# Patient Record
Sex: FEMALE | Race: WHITE | NOT HISPANIC OR LATINO | Employment: FULL TIME | URBAN - METROPOLITAN AREA
[De-identification: names, ages, dates, MRNs, and addresses within clinical notes are randomized per-mention and may not be internally consistent; named-entity substitution may affect disease eponyms.]

---

## 2017-02-21 ENCOUNTER — TELEPHONE (OUTPATIENT)
Dept: PEDIATRICS | Facility: CLINIC | Age: 32
End: 2017-02-21

## 2017-02-21 DIAGNOSIS — O92.70 LACTATION DISORDER: Primary | ICD-10-CM

## 2017-02-21 RX ORDER — MUPIROCIN 20 MG/G
OINTMENT TOPICAL
Qty: 22 G | Refills: 1 | Status: SHIPPED | OUTPATIENT
Start: 2017-02-21 | End: 2017-03-29

## 2017-03-29 ENCOUNTER — HOSPITAL ENCOUNTER (EMERGENCY)
Facility: CLINIC | Age: 32
Discharge: HOME | End: 2017-03-29
Attending: EMERGENCY MEDICINE | Admitting: EMERGENCY MEDICINE
Payer: COMMERCIAL

## 2017-03-29 VITALS
TEMPERATURE: 97.8 F | DIASTOLIC BLOOD PRESSURE: 67 MMHG | OXYGEN SATURATION: 97 % | SYSTOLIC BLOOD PRESSURE: 108 MMHG | HEART RATE: 73 BPM | WEIGHT: 165 LBS | RESPIRATION RATE: 16 BRPM

## 2017-03-29 LAB
AMPHETAMINES UR QL SCN: NORMAL
BARBITURATES UR QL: NORMAL
BENZODIAZ UR QL: NORMAL
CANNABINOIDS UR QL SCN: NORMAL
COCAINE UR QL: NORMAL
ETHANOL UR QL SCN: NORMAL
OPIATES UR QL SCN: NORMAL

## 2017-03-29 PROCEDURE — 80320 DRUG SCREEN QUANTALCOHOLS: CPT | Performed by: EMERGENCY MEDICINE

## 2017-03-29 PROCEDURE — 99285 EMERGENCY DEPT VISIT HI MDM: CPT | Mod: 25

## 2017-03-29 PROCEDURE — 90791 PSYCH DIAGNOSTIC EVALUATION: CPT

## 2017-03-29 PROCEDURE — 80307 DRUG TEST PRSMV CHEM ANLYZR: CPT | Performed by: EMERGENCY MEDICINE

## 2017-03-29 PROCEDURE — 99284 EMERGENCY DEPT VISIT MOD MDM: CPT | Mod: Z6 | Performed by: EMERGENCY MEDICINE

## 2017-03-29 RX ORDER — SERTRALINE HYDROCHLORIDE 25 MG/1
25 TABLET, FILM COATED ORAL DAILY
Qty: 30 TABLET | Refills: 1 | Status: SHIPPED | OUTPATIENT
Start: 2017-03-29 | End: 2020-11-17

## 2017-03-29 RX ORDER — PRENATAL VIT/IRON FUM/FOLIC AC 27MG-0.8MG
1 TABLET ORAL DAILY
COMMUNITY
End: 2020-08-28

## 2017-03-29 NOTE — DISCHARGE INSTRUCTIONS
"Begin zoloft.     Call your OB doctor tomorrow and arrange follow up for 2 weeks for medication recheck.     Individual therapy was scheduled for next Monday, 9 am.     If you are feeling worse, feeling suicidal or homicidal, seek medical attention asap.         Understanding Postpartum Depression  You ve just had a baby. You know you should be excited and happy. But instead you find yourself crying for no reason. You may have trouble coping with your daily tasks. You feel sad, tired, and hopeless most of the time. You may even feel ashamed or guilty. But what you re going through is not your fault and you can feel better. Talk to your healthcare provider. He or she can help.    What is depression?  Depression is a mood disorder that affects the way you think and feel. The most common symptom is a feeling of deep sadness. You may also feel as if you just can t cope with life. Other symptoms include:    Gaining or losing a lot of weight    Sleeping too much or too little    Feeling tired all the time    Feeling restless    Crying a lot    Having too little or too much appetite.    Withdrawing from friends and family    Having headaches, aches and pains, or stomach problems that won't go away.    Fears of harming your baby    Lack of interest in your baby    Feeling worthless or guilty    No longer finding pleasure in things you used to    Having trouble thinking clearly or making decisions    Thinking about death or suicide   Depression after childbirth  You may be weepy and tired right after giving birth. These feelings are normal. They re sometimes called the  baby blues.  These blues go away after 2 or 3 weeks. However, postpartum (meaning  after birth ) depression lasts much longer and is more severe than the \"baby blues.\" It can make you feel sad and hopeless. You may also fear that your baby will be harmed and worry about being a bad mother.  What causes postpartum depression?  The exact cause of postpartum " depression is unknown. Changes in brain chemistry or structure are believed to play a big role in depression. It may be due to changes in your hormones during and after childbirth. You may also be tired from caring for your baby and adjusting to being a mother. All these factors may make you feel depressed. In some cases, your genes may also play a role.  Depression can be treated  The good news is that there are many ways to treat postpartum depression. Talking to your healthcare provider is the first step toward feeling better.  Resources    National [place] of Mental Cregql476-103-8186hqi.[facility].nih.gov    National [place] on Mental Hcewwhx201-501-3681dwu.alise.org    Mental Health Iuigbpw416-969-7688dcu.[facility].org    National Suicide Ouhpguh835-879-2570 (800-SUICIDE)     7652-3231 [employer] [address] [place] [address], [place] [address]. All rights reserved. This information is not intended as a substitute for professional medical care. Always follow your healthcare professional's instructions.

## 2017-03-29 NOTE — ED AVS SNAPSHOT
[facility], [place], Emergency Department    [address]    [facility] [address]    Phone:  [phone]    Fax:  [phone]                                       Sarah Schlatter   MRN: [id number]    Department:  [facility], Emergency Department   Date of Visit:  3/29/2017           After Visit Summary Signature Page     I have received my discharge instructions, and my questions have been answered. I have discussed any challenges I see with this plan with the nurse or doctor.    ..........................................................................................................................................  Patient/Patient Representative Signature      ..........................................................................................................................................  Patient Representative Print Name and Relationship to Patient    ..................................................               ................................................  Date                                            Time    ..........................................................................................................................................  Reviewed by Signature/Title    ...................................................              ..............................................  Date                                                            Time

## 2017-03-29 NOTE — ED PROVIDER NOTES
History     Chief Complaint   Patient presents with     Depression     having post-partum depression was sent her from her OB clinic     HPI  Sarah Schlatter is a 31 year old female who presents with her  due to depression.  She delivered her first child 6 weeks ago.  Since then she has been feeling sad and wishes at time she were dead.  No si or intent.  She saw her OB and talked about starting meds.  She says she was hoping this depression would go away it hasn't gotten better.  No prior hx of depression or mental health issues.   She tends to be private about her feelings so  was not completely aware of how sad she was feeling.   She is breastfeeding.     I have reviewed the Medications, Allergies, Past Medical and Surgical History, and Social History in the Epic system.    Review of Systems   Psychiatric/Behavioral: Positive for dysphoric mood. Negative for hallucinations, self-injury and suicidal ideas. The patient is nervous/anxious.    All other systems reviewed and are negative.      Physical Exam   BP: 122/60  Pulse: 78  Temp: 97.8  F (36.6  C)  Resp: 16  Weight: 74.8 kg (165 lb)  SpO2: 97 %  Physical Exam   Constitutional: She is oriented to person, place, and time. She appears well-developed and well-nourished. No distress.   HENT:   Head: Normocephalic and atraumatic.   Right Ear: External ear normal.   Left Ear: External ear normal.   Nose: Nose normal.   Eyes: EOM are normal. Pupils are equal, round, and reactive to light.   Neck: Normal range of motion.   Cardiovascular: Normal rate, regular rhythm and normal heart sounds.    Pulmonary/Chest: Effort normal and breath sounds normal.   Abdominal: Soft.   Musculoskeletal: Normal range of motion.   Neurological: She is alert and oriented to person, place, and time.   Skin: Skin is warm and dry. She is not diaphoretic.   Psychiatric: Her speech is normal and behavior is normal. Judgment and thought content normal. Her mood appears anxious.  Cognition and memory are normal. She exhibits a depressed mood (tearful).   Nursing note and vitals reviewed.      ED Course     ED Course     Procedures             Labs Ordered and Resulted from Time of ED Arrival Up to the Time of Departure from the ED   DRUG ABUSE SCREEN 6 CHEM DEP URINE ([facility])       Assessments & Plan (with Medical Decision Making)   The patient was seen by myself and the DEC .  She is experiencing post partum depression.  No si/hi.  No prior hx of mental health issues.  She does not want to be admitted.  I do not feel she is holdable at this time.  She is willing to start meds.  zoloft prescribed.  She is also interested in therapy which was scheduled.   is here and supportive.  They will return if any worsening or safety issues.  She will contact her OB to see if they will manage her zoloft.    I have reviewed the nursing notes.    I have reviewed the findings, diagnosis, plan and need for follow up with the patient.    Discharge Medication List as of 3/29/2017  6:53 PM      START taking these medications    Details   sertraline (ZOLOFT) 25 MG tablet Take 1 tablet (25 mg) by mouth daily Take 25 mg by mouth daily for 7 days, then increase to 50 mg by mouth daily if tolerated well., Disp-30 tablet, R-1, Local Print             Final diagnoses:   Post-partum depression       3/29/2017   [facility], [place], EMERGENCY DEPARTMENT     Triny Dsouza MD  04/01/17 3259

## 2017-03-29 NOTE — ED AVS SNAPSHOT
[facility], Emergency Department    [address]    [place] [address]    Phone:  [phone]    Fax:  [phone]                                       Sarah Schlatter   MRN: [id number]    Department:  [facility], Emergency Department   Date of Visit:  3/29/2017           Patient Information     Date Of Birth          1985        Your diagnoses for this visit were:     Post-partum depression        You were seen by Triny Dsouza MD.        Discharge Instructions       Begin zoloft.     Call your OB doctor tomorrow and arrange follow up for 2 weeks for medication recheck.     Individual therapy was scheduled for next Monday, 9 am.         24 Hour Appointment Hotline       To make an appointment at any [facility] clinic, call [phone] ([phone]). If you don't have a family doctor or clinic, we will help you find one. [facility] clinics are conveniently located to serve the needs of you and your family.             Review of your medicines      START taking        Dose / Directions Last dose taken    sertraline 25 MG tablet   Commonly known as:  ZOLOFT   Dose:  25 mg   Quantity:  30 tablet        Take 1 tablet (25 mg) by mouth daily Take 25 mg by mouth daily for 7 days, then increase to 50 mg by mouth daily if tolerated well.   Refills:  1          Our records show that you are taking the medicines listed below. If these are incorrect, please call your family doctor or clinic.        Dose / Directions Last dose taken    prenatal multivitamin  plus iron 27-0.8 MG Tabs per tablet   Dose:  1 tablet        Take 1 tablet by mouth daily   Refills:  0                Prescriptions were sent or printed at these locations (1 Prescription)                   Other Prescriptions                Printed at Department/Unit printer (1 of 1)         sertraline (ZOLOFT) 25 MG tablet                Procedures and tests performed during your visit     Drug abuse screen 6 urine (tox)      Orders Needing  "Specimen Collection     None      Pending Results     No orders found from 3/27/2017 to 3/30/2017.            Pending Culture Results     No orders found from 3/27/2017 to 3/30/2017.            Thank you for choosing [facility]       Thank you for choosing [facility] for your care. Our goal is always to provide you with excellent care. Hearing back from our patients is one way we can continue to improve our services. Please take a few minutes to complete the written survey that you may receive in the mail after you visit with us. Thank you!        [employer] Information     [employer] lets you send messages to your doctor, view your test results, renew your prescriptions, schedule appointments and more. To sign up, go to [email or web address] . Click on \"Log in\" on the left side of the screen, which will take you to the Welcome page. Then click on \"Sign up Now\" on the right side of the page.     You will be asked to enter the access code listed below, as well as some personal information. Please follow the directions to create your username and password.     Your access code is: [email or web address]  Expires: 2017  6:53 PM     Your access code will  in 90 days. If you need help or a new code, please call your [facility] clinic or [phone].        Care EveryWhere ID     This is your Care EveryWhere ID. This could be used by other organizations to access your [facility] medical records  [id number]        After Visit Summary       This is your record. Keep this with you and show to your community pharmacist(s) and doctor(s) at your next visit.                  "

## 2017-04-01 ASSESSMENT — ENCOUNTER SYMPTOMS
NERVOUS/ANXIOUS: 1
HALLUCINATIONS: 0
DYSPHORIC MOOD: 1

## 2017-04-24 ENCOUNTER — TELEPHONE (OUTPATIENT)
Dept: OTHER | Facility: CLINIC | Age: 32
End: 2017-04-24

## 2017-04-24 NOTE — TELEPHONE ENCOUNTER
4/24/2017    Call Regarding Onboarding [employer]    Attempt 1    Message     Comments: patient has declined to onboard at this time.      Outreach   scott

## 2018-03-31 ENCOUNTER — HOSPITAL ENCOUNTER (OUTPATIENT)
Facility: CLINIC | Age: 33
Discharge: HOME | End: 2018-03-31
Attending: OBSTETRICS & GYNECOLOGY | Admitting: OBSTETRICS & GYNECOLOGY
Payer: COMMERCIAL

## 2018-03-31 VITALS
BODY MASS INDEX: 30.53 KG/M2 | SYSTOLIC BLOOD PRESSURE: 118 MMHG | HEART RATE: 68 BPM | DIASTOLIC BLOOD PRESSURE: 67 MMHG | WEIGHT: 190 LBS | RESPIRATION RATE: 12 BRPM | HEIGHT: 66 IN | TEMPERATURE: 98.1 F

## 2018-03-31 PROBLEM — O36.8190 DECREASED FETAL MOVEMENT: Status: ACTIVE | Noted: 2018-03-31

## 2018-03-31 PROCEDURE — 59025 FETAL NON-STRESS TEST: CPT

## 2018-03-31 PROCEDURE — G0463 HOSPITAL OUTPT CLINIC VISIT: HCPCS | Mod: 25

## 2018-03-31 NOTE — PLAN OF CARE
Data: Patient presented [facility] the [facility] at .   Reason for maternal/fetal assessment per patient is decreased fetal movement for past 40 minutes this is babies most active time of day and usual kick counts very good. Patient is a .Is very anxious and sobbing upon admission. Prenatal record reviewed.      Obstetric History       T0      L1     SAB0   TAB0   Ectopic0   Multiple0   Live Births0       # Outcome Date GA Lbr Lam/2nd Weight Sex Delivery Anes PTL Lv   2 Current            1                   Medical History: History reviewed. No pertinent past medical history.. Gestational Age 28w2d. VSS. Cervix: not assessed   Patient denies cramping, backache, vaginal discharge, pelvic pressure, UTI symptoms, GI problems, bloody show, vaginal bleeding, edema, headache, visual disturbances, epigastric or URQ pain, abdominal pain, rupture of membranes. Support persons her  is  present.  Action: EFM/EUM on  Triage assessment completed. -135 + accelerations, moderate variability, no decelerations, denies contractions and none palpated, patient did drink a lot of juice on way into hospital, NST reactive tracing and patient marking when feeling movemenets and baby very active now, patient and  relieved  Response: Dr.S Lovett  informed of patient's arrival and reactive NST Plan per provider is to reassure and instruct on kick counts. Patient verbalized understanding of education and verbalized agreement with plan. Discharged ambulatory at 1640

## 2018-03-31 NOTE — IP AVS SNAPSHOT
[facility] Labor and Delivery    [address]    [facility] [address]    Phone:  [phone]    Fax:  [phone]                                       After Visit Summary   3/31/2018    Sarah Schlatter    MRN: [id number]           After Visit Summary Signature Page     I have received my discharge instructions, and my questions have been answered. I have discussed any challenges I see with this plan with the nurse or doctor.    ..........................................................................................................................................  Patient/Patient Representative Signature      ..........................................................................................................................................  Patient Representative Print Name and Relationship to Patient    ..................................................               ................................................  Date                                            Time    ..........................................................................................................................................  Reviewed by Signature/Title    ...................................................              ..............................................  Date                                                            Time

## 2018-03-31 NOTE — DISCHARGE INSTRUCTIONS
Discharge Instruction for Undelivered Patients      You were seen for: decreased fetal movement  We Consulted:  Dr LOWELL Lovett  You had (Test or Medicine):  NST    Diet:   regular     Activity:  regular    Call your provider if you notice:  Swelling in your face or increased swelling in your hands or legs.  Headaches that are not relieved by Tylenol (acetaminophen).  Changes in your vision (blurring: seeing spots or stars.)  Nausea (sick to your stomach) and vomiting (throwing up).   Weight gain of 5 pounds or more per week.  Heartburn that doesn't go away.  Signs of bladder infection: pain when you urinate (use the toilet), need to go more often and more urgently.  The bag of villa (rupture of membranes) breaks, or you notice leaking in your underwear.  Bright red blood in your underwear.  Abdominal (lower belly) or stomach pain  Second (plus) baby: Contractions (tightening) less than 10 minutes apart and getting stronger.  *If less than 34 weeks: Contractions (tightenings) more than 6 times in one hour.  Increase or change in vaginal discharge (note the color and amount  Kick counts reviewed and agreed to understanding  Follow-up:  With regular Dr this week

## 2018-03-31 NOTE — IP AVS SNAPSHOT
"                  MRN:[id number]                      After Visit Summary   3/31/2018    Sarah Schlatter    MRN: [id number]           Thank you!     Thank you for choosing [facility] for your care. Our goal is always to provide you with excellent care. Hearing back from our patients is one way we can continue to improve our services. Please take a few minutes to complete the written survey that you may receive in the mail after you visit. If you would like to speak to someone directly about your visit please contact Patient Relations at [phone]. Thank you!          Patient Information     Date Of Birth          1985        About your hospital stay     You were admitted on:  March 31, 2018 You last received care in the:  [facility] Labor and Delivery    You were discharged on:  March 31, 2018       Who to Call     For medical emergencies, please call 911.  For non-urgent questions about your medical care, please call your primary care provider or clinic, None          Attending Provider     Provider Specialty    Ledy Lovett MD OB/Gyn       Primary Care Provider Fax #    Physician No Ref-Primary [phone]      Pending Results     No orders found from 3/29/2018 to 4/1/2018.            Admission Information     Date & Time Provider Department Dept. Phone    3/31/2018 Ledy Lovett MD [facility] Labor and Delivery [phone]      Your Vitals Were     Blood Pressure Pulse Temperature Respirations Height Weight    118/67 68 98.1  F (36.7  C) (Oral) 12 1.676 m (5' 6\") 86.2 kg (190 lb)    BMI (Body Mass Index)                   30.67 kg/m2           [employer] Information     [employer] lets you send messages to your doctor, view your test results, renew your prescriptions, schedule appointments and more. To sign up, go to [email or web address] . Click on \"Log in\" on the left side of the screen, which will take you to the Welcome page. Then click on \"Sign up Now\" on the right side of the " page.     You will be asked to enter the access code listed below, as well as some personal information. Please follow the directions to create your username and password.     Your access code is: [email or web address]  Expires: 2018  4:31 PM     Your access code will  in 90 days. If you need help or a new code, please call your [facility] clinic or [phone].        Care EveryWhere ID     This is your Care EveryWhere ID. This could be used by other organizations to access your [facility] medical records  [id number]        Equal Access to Services     [facility]: Haddeana Adrian, warobyn alba, qaromulo vicentealjailyn eubanks, sandra alvarenga . So [facility] [phone].    ATENCIÓN: Si habla español, tiene a mary disposición servicios gratuitos de asistencia lingüística. Llame al [phone].    We comply with applicable federal civil rights laws and [place] laws. We do not discriminate on the basis of race, color, national origin, age, disability, sex, sexual orientation, or gender identity.               Review of your medicines      UNREVIEWED medicines. Ask your doctor about these medicines        Dose / Directions    prenatal multivitamin plus iron 27-0.8 MG Tabs per tablet        Dose:  1 tablet   Take 1 tablet by mouth daily   Refills:  0       sertraline 25 MG tablet   Commonly known as:  ZOLOFT        Dose:  25 mg   Take 1 tablet (25 mg) by mouth daily Take 25 mg by mouth daily for 7 days, then increase to 50 mg by mouth daily if tolerated well.   Quantity:  30 tablet   Refills:  1                Protect others around you: Learn how to safely use, store and throw away your medicines at www.disposemymeds.org.             Medication List: This is a list of all your medications and when to take them. Check marks below indicate your daily home schedule. Keep this list as a reference.      Medications           Morning Afternoon Evening Bedtime As Needed    prenatal  multivitamin plus iron 27-0.8 MG Tabs per tablet   Take 1 tablet by mouth daily                                sertraline 25 MG tablet   Commonly known as:  ZOLOFT   Take 1 tablet (25 mg) by mouth daily Take 25 mg by mouth daily for 7 days, then increase to 50 mg by mouth daily if tolerated well.

## 2018-04-05 DIAGNOSIS — R10.11 ABDOMINAL PAIN, RIGHT UPPER QUADRANT: Primary | ICD-10-CM

## 2018-04-06 ENCOUNTER — RADIANT APPOINTMENT (OUTPATIENT)
Dept: ULTRASOUND IMAGING | Facility: CLINIC | Age: 33
End: 2018-04-06
Attending: SURGERY

## 2018-04-06 DIAGNOSIS — R10.11 ABDOMINAL PAIN, RIGHT UPPER QUADRANT: ICD-10-CM

## 2018-06-21 ENCOUNTER — TELEPHONE (OUTPATIENT)
Dept: PEDIATRICS | Facility: CLINIC | Age: 33
End: 2018-06-21

## 2018-06-21 DIAGNOSIS — O92.70 LACTATION DISORDER: Primary | ICD-10-CM

## 2018-06-21 RX ORDER — MUPIROCIN 20 MG/G
OINTMENT TOPICAL
Qty: 30 G | Refills: 1 | Status: SHIPPED | OUTPATIENT
Start: 2018-06-21 | End: 2020-08-28

## 2018-12-03 DIAGNOSIS — J01.01 ACUTE RECURRENT MAXILLARY SINUSITIS: Primary | ICD-10-CM

## 2019-02-15 ENCOUNTER — HEALTH MAINTENANCE LETTER (OUTPATIENT)
Age: 34
End: 2019-02-15

## 2019-12-03 NOTE — TELEPHONE ENCOUNTER
ONCOLOGY INTAKE: Records Information      APPT INFORMATION:  Referring provider:  Self Referral   Referring provider s clinic:  [facility]  Reason for visit/diagnosis:  Family history of cancer  Has patient been notified of appointment date and time?: Yes    RECORDS INFORMATION:  Were the records received with the referral (via [employer])? NO    Has patient been seen for any external appt for this diagnosis? No    If yes, where? NA    Has patient had any imaging or procedures outside of [facility] for this condition? No    ADDITIONAL INFORMATION:  Patient is being seen for genetic counseling due to a family history of cancer and she doesn't want to be on the waiting list.

## 2019-12-11 ENCOUNTER — PRE VISIT (OUTPATIENT)
Dept: ONCOLOGY | Facility: CLINIC | Age: 34
End: 2019-12-11

## 2019-12-11 ENCOUNTER — OFFICE VISIT (OUTPATIENT)
Dept: ONCOLOGY | Facility: CLINIC | Age: 34
End: 2019-12-11
Attending: GENETIC COUNSELOR, MS
Payer: COMMERCIAL

## 2019-12-11 DIAGNOSIS — Z80.42 FAMILY HISTORY OF PROSTATE CANCER: Primary | ICD-10-CM

## 2019-12-11 DIAGNOSIS — Z80.3 FAMILY HISTORY OF MALIGNANT NEOPLASM OF BREAST: ICD-10-CM

## 2019-12-11 PROCEDURE — 96040 ZZH GENETIC COUNSELING, EACH 30 MINUTES: CPT | Mod: ZF | Performed by: GENETIC COUNSELOR, MS

## 2019-12-11 NOTE — LETTER
Cancer Risk Management  Program Locations    [facility]  [facility]  Mailing Address  Cancer Risk Management Program  [address]  [place] [address]    New patient appointments  [phone]  January 3, 2020    Sarah Schlatter  [address] [facility] [address]      Dear Arpita,    It was a pleasure meeting with you at the Cancer Risk Management Program at the [facility] on 12/11/2019.  Here is a copy of the progress note from your recent genetic counseling visit to the Cancer Risk Management Program.  If you have any additional questions, please feel free to call.    Referring Provider: Self    Presenting Information:   I met with Sarah Schlatter and her father, Rufino, today for genetic counseling at the Cancer Risk Management Program at the [facility] to discuss her family history of cancer.  She is here today to review this history, cancer screening recommendations, and available genetic testing options.    Personal History:  Arpita is a 34 year old female. She does not have any personal history of cancer.      She had her first menstrual period at age 15, her first child at age 31, and is premenopausal. Arpita has her ovaries, fallopian tubes and uterus in place. She reports that she has not used hormone replacement therapy or infertility medications. She has used oral contraceptives.      She has not had any breast imaging or a colonoscopy due to her age. Arpita reported no history of tobacco use and social alcohol use.    Family History: (Please see scanned pedigree for detailed family history information)  Siblings:    She has one sister (age 35) and one brother (age 27) with no known history of cancer.   Maternal:    Her mother, Dorina, was diagnosed with breast cancer at  age 55. She was seen for genetic counseling and testing at [facility] in 2017. She gave verbal permission for her genetic testing results to be reviewed today. The Breast Cancer Expanded panel testing was performed at the [facility] at [facility] (genes analyzed: KIMBER, BARD1, BRCA1, BRCA2, BRIP1, CDH1, CHEK2, MRE11A, MUTYH, NBN, NF1, PALB2, PTEN, RAD50, RAD51C, RAD51D, STK11, TP53). No pathogenic mutations were detected. A variant of uncertain significance was detected in the CDH1 gene ([id number]).  We reviewed that a variant of uncertain significance (VUS) is a variation in a gene, but it is unclear how this impacts cancer risk in the family. We discussed that medical management for individuals is typically not changed on the basis of a VUS.     She reports today that since her mother's genetic counseling appointment, her maternal uncle was recently diagnosed with prostate cancer at age 59. She reports that this is not an aggressive prostate cancer.    Her maternal grandfather is in his 80s and was diagnosed with prostate cancer at age 63 and treated with surgery.     Her grandfather had a sister (Arpita's great-aunt) who was diagnosed with breast cancer in her 40s and passed away in her 70s    Another great-aunt has no known history of cancer. She has a daughter who was diagnosed with breast cancer at age 47 and has reportedly had negative genetic testing. This great-aunt also has a son who was diagnosed with testicular cancer in his 20s or 30s.    Her maternal great-grandfather (grandmother's father) was diagnosed with throat cancer in his 70s. He had a history of smoking.  Paternal:    Her father was also present for a joint genetic counseling visit today. He was recently diagnosed with prostate cancer (oskar score of 7) in November 2019 at age 58. He will be having surgery in January 2020. He also has a history of colon polyps (at least one tubular adenoma).    Her grandmother  is 87 years old with no known history of cancer. She has had colon polyps.     Her grandmother's sister (Arpita's great-aunt) passed away in her 80s due to an unknown cancer type.     Her grandmother had five brothers who have all passed away. One of these uncles passed away at a younger age and may have had cancer.    Her grandmother's father passed away in his 50s or 60s due to bone cancer diagnosed in his 50s or 60s.    Her grandfather passed away at age 67 with no known history of cancer. He had COPD and a history of smoking.      Her grandfather had three brothers and one sister who have all passed away. One these brothers may have had cancer.     Arpita's father reports limited contact with many of his relatives.    Her maternal ethnicity is Swedish. Her paternal ethnicity is Swedish. There is no known Ashkenazi Denominational ancestry on either side of her family.     Discussion:    Arpita's family history of cancer is suggestive of a hereditary cancer syndrome.    We reviewed the features of sporadic, familial, and hereditary cancers. Her mother has already had some genetic testing which did not detect any pathogenic mutations. Her father opted to proceed with genetic testing today. We discussed that it would be best to wait for his results and then determine whether Arpita would need to proceed with any genetic testing. If neither of her parents are found to have a genetic mutation, then she would not need to have genetic testing. In that case, she should continue with her cancer screenings based on her family history.      As part of her joint genetic counseling visit with her father, we discussed the BRCA1 and BRCA2 genes. Mutations in these genes cause a condition known as Hereditary Breast and Ovarian Cancer syndrome (HBOC). Women with a mutation in either of these genes are at increased risk for breast and ovarian cancer. There is also an increased risk for a second primary breast cancer. Men with a mutation in  either of these genes are at increased risk for breast and prostate cancer. Both women and men may also be at increased risk for pancreatic cancer and melanoma.     We also discussed multi-gene panel testing options related to prostate cancer.    Her father elected to proceed with genetic testing via a CustomNext-Cancer gene panel (a combination of the CancerNext panel plus three additional genes in which mutations are associated with an increased risk for colon polyps) offered by [employer]. He signed a release form today to allow me to discuss his genetic test results with Arpita when they are available to allow for a complete risk assessment for her.     Plan:  1) Arpita elected not to proceed with genetic testing today and to wait until we have her father's results for review. At that time we will discuss genetic testing for her if necessary and review medical management recommendations.    Saritha Ureña MS, [facility]  Licensed Genetic Counselor  Office: [phone]

## 2019-12-11 NOTE — LETTER
12/11/2019       RE: Sarah Schlatter  [address] [place] [id number]     Dear Colleague,    Thank you for referring your patient, Sarah Schlatter, to the [facility]. Please see a copy of my visit note below.    12/11/2019    Referring Provider: Self    Presenting Information:   I met with Sarah Schlatter and her father, Rufino, today for genetic counseling at the Cancer Risk Management Program at the [facility] to discuss her family history of cancer.  She is here today to review this history, cancer screening recommendations, and available genetic testing options.    Personal History:  Arpita is a 34 year old female. She does not have any personal history of cancer.      She had her first menstrual period at age 15, her first child at age 31, and is premenopausal. Arpita has her ovaries, fallopian tubes and uterus in place. She reports that she has not used hormone replacement therapy or infertility medications. She has used oral contraceptives.      She has not had any breast imaging or a colonoscopy due to her age. Arpita reported no history of tobacco use and social alcohol use.    Family History: (Please see scanned pedigree for detailed family history information)  Siblings:    She has one sister (age 35) and one brother (age 27) with no known history of cancer.   Maternal:    Her mother, Dorina, was diagnosed with breast cancer at age 55. She was seen for genetic counseling and testing at [facility] in 2017. She gave verbal permission for her genetic testing results to be reviewed today. The Breast Cancer Expanded panel testing was performed at the [facility] at [facility] (genes analyzed: KIMBER, BARD1, BRCA1, BRCA2, BRIP1, CDH1, CHEK2, MRE11A, MUTYH, NBN, NF1, PALB2, PTEN, RAD50, RAD51C, RAD51D, STK11, TP53). No pathogenic mutations were detected. A variant of uncertain significance was detected in the CDH1 gene ([id number]).  We reviewed that a  variant of uncertain significance (VUS) is a variation in a gene, but it is unclear how this impacts cancer risk in the family. We discussed that medical management for individuals is typically not changed on the basis of a VUS.     She reports today that since her mother's genetic counseling appointment, her maternal uncle was recently diagnosed with prostate cancer at age 59. She reports that this is not an aggressive prostate cancer.    Her maternal grandfather is in his 80s and was diagnosed with prostate cancer at age 63 and treated with surgery.     Her grandfather had a sister (Arpita's great-aunt) who was diagnosed with breast cancer in her 40s and passed away in her 70s    Another great-aunt has no known history of cancer. She has a daughter who was diagnosed with breast cancer at age 47 and has reportedly had negative genetic testing. This great-aunt also has a son who was diagnosed with testicular cancer in his 20s or 30s.    Her maternal great-grandfather (grandmother's father) was diagnosed with throat cancer in his 70s. He had a history of smoking.  Paternal:    Her father was also present for a joint genetic counseling visit today. He was recently diagnosed with prostate cancer (oskar score of 7) in November 2019 at age 58. He will be having surgery in January 2020. He also has a history of colon polyps (at least one tubular adenoma).    Her grandmother is 87 years old with no known history of cancer. She has had colon polyps.     Her grandmother's sister (Arpita's great-aunt) passed away in her 80s due to an unknown cancer type.     Her grandmother had five brothers who have all passed away. One of these uncles passed away at a younger age and may have had cancer.    Her grandmother's father passed away in his 50s or 60s due to bone cancer diagnosed in his 50s or 60s.    Her grandfather passed away at age 67 with no known history of cancer. He had COPD and a history of smoking.      Her grandfather  had three brothers and one sister who have all passed away. One these brothers may have had cancer.     Arpita's father reports limited contact with many of his relatives.    Her maternal ethnicity is Belgian. Her paternal ethnicity is Belgian. There is no known Ashkenazi Congregational ancestry on either side of her family.     Discussion:    Arpita's family history of cancer is suggestive of a hereditary cancer syndrome.    We reviewed the features of sporadic, familial, and hereditary cancers. Her mother has already had some genetic testing which did not detect any pathogenic mutations. Her father opted to proceed with genetic testing today. We discussed that it would be best to wait for his results and then determine whether Arpita would need to proceed with any genetic testing. If neither of her parents are found to have a genetic mutation, then she would not need to have genetic testing. In that case, she should continue with her cancer screenings based on her family history.      As part of her joint genetic counseling visit with her father, we discussed the BRCA1 and BRCA2 genes. Mutations in these genes cause a condition known as Hereditary Breast and Ovarian Cancer syndrome (HBOC). Women with a mutation in either of these genes are at increased risk for breast and ovarian cancer. There is also an increased risk for a second primary breast cancer. Men with a mutation in either of these genes are at increased risk for breast and prostate cancer. Both women and men may also be at increased risk for pancreatic cancer and melanoma.     We also discussed multi-gene panel testing options related to prostate cancer.    Her father elected to proceed with genetic testing via a CustomNext-Cancer gene panel (a combination of the CancerNext panel plus three additional genes in which mutations are associated with an increased risk for colon polyps) offered by [employer]. He signed a release form today to allow me to discuss his  genetic test results with Arpita when they are available to allow for a complete risk assessment for her.     Plan:  1) Arpita elected not to proceed with genetic testing today and to wait until we have her father's results for review. At that time we will discuss genetic testing for her if necessary and review medical management recommendations.    Face to face time: 30 minutes    Saritha Ureña MS, [facility]  Licensed Genetic Counselor  Office: [phone]        Again, thank you for allowing me to participate in the care of your patient.      Sincerely,    Saritha Ureña, GC

## 2019-12-11 NOTE — LETTER
Date:January 6, 2020      Provider requested that no letter be sent. Do not send.       [facility] Health Information

## 2019-12-12 NOTE — PROGRESS NOTES
12/11/2019    Referring Provider: Self    Presenting Information:   I met with Sarah Schlatter and her father, Rufino, today for genetic counseling at the Cancer Risk Management Program at the [facility] to discuss her family history of cancer.  She is here today to review this history, cancer screening recommendations, and available genetic testing options.    Personal History:  Arpita is a 34 year old female. She does not have any personal history of cancer.      She had her first menstrual period at age 15, her first child at age 31, and is premenopausal. Arpita has her ovaries, fallopian tubes and uterus in place. She reports that she has not used hormone replacement therapy or infertility medications. She has used oral contraceptives.      She has not had any breast imaging or a colonoscopy due to her age. Arpita reported no history of tobacco use and social alcohol use.    Family History: (Please see scanned pedigree for detailed family history information)  Siblings:    She has one sister (age 35) and one brother (age 27) with no known history of cancer.   Maternal:    Her mother, Dorina, was diagnosed with breast cancer at age 55. She was seen for genetic counseling and testing at [facility] in 2017. She gave verbal permission for her genetic testing results to be reviewed today. The Breast Cancer Expanded panel testing was performed at the [facility] at [facility] (genes analyzed: KIMBER, BARD1, BRCA1, BRCA2, BRIP1, CDH1, CHEK2, MRE11A, MUTYH, NBN, NF1, PALB2, PTEN, RAD50, RAD51C, RAD51D, STK11, TP53). No pathogenic mutations were detected. A variant of uncertain significance was detected in the CDH1 gene ([id number]).  We reviewed that a variant of uncertain significance (VUS) is a variation in a gene, but it is unclear how this impacts cancer risk in the family. We discussed that medical management for individuals is typically not changed on the basis of a VUS.     She  reports today that since her mother's genetic counseling appointment, her maternal uncle was recently diagnosed with prostate cancer at age 59. She reports that this is not an aggressive prostate cancer.    Her maternal grandfather is in his 80s and was diagnosed with prostate cancer at age 63 and treated with surgery.     Her grandfather had a sister (Arpita's great-aunt) who was diagnosed with breast cancer in her 40s and passed away in her 70s    Another great-aunt has no known history of cancer. She has a daughter who was diagnosed with breast cancer at age 47 and has reportedly had negative genetic testing. This great-aunt also has a son who was diagnosed with testicular cancer in his 20s or 30s.    Her maternal great-grandfather (grandmother's father) was diagnosed with throat cancer in his 70s. He had a history of smoking.  Paternal:    Her father was also present for a joint genetic counseling visit today. He was recently diagnosed with prostate cancer (oskar score of 7) in November 2019 at age 58. He will be having surgery in January 2020. He also has a history of colon polyps (at least one tubular adenoma).    Her grandmother is 87 years old with no known history of cancer. She has had colon polyps.     Her grandmother's sister (Deshauns great-aunt) passed away in her 80s due to an unknown cancer type.     Her grandmother had five brothers who have all passed away. One of these uncles passed away at a younger age and may have had cancer.    Her grandmother's father passed away in his 50s or 60s due to bone cancer diagnosed in his 50s or 60s.    Her grandfather passed away at age 67 with no known history of cancer. He had COPD and a history of smoking.      Her grandfather had three brothers and one sister who have all passed away. One these brothers may have had cancer.     Arpita's father reports limited contact with many of his relatives.    Her maternal ethnicity is Pashto. Her paternal ethnicity is  Luxembourgish. There is no known Ashkenazi Holiness ancestry on either side of her family.     Discussion:    Arpita's family history of cancer is suggestive of a hereditary cancer syndrome.    We reviewed the features of sporadic, familial, and hereditary cancers. Her mother has already had some genetic testing which did not detect any pathogenic mutations. Her father opted to proceed with genetic testing today. We discussed that it would be best to wait for his results and then determine whether Arpita would need to proceed with any genetic testing. If neither of her parents are found to have a genetic mutation, then she would not need to have genetic testing. In that case, she should continue with her cancer screenings based on her family history.      As part of her joint genetic counseling visit with her father, we discussed the BRCA1 and BRCA2 genes. Mutations in these genes cause a condition known as Hereditary Breast and Ovarian Cancer syndrome (HBOC). Women with a mutation in either of these genes are at increased risk for breast and ovarian cancer. There is also an increased risk for a second primary breast cancer. Men with a mutation in either of these genes are at increased risk for breast and prostate cancer. Both women and men may also be at increased risk for pancreatic cancer and melanoma.     We also discussed multi-gene panel testing options related to prostate cancer.    Her father elected to proceed with genetic testing via a CustomNext-Cancer gene panel (a combination of the CancerNext panel plus three additional genes in which mutations are associated with an increased risk for colon polyps) offered by [employer]. He signed a release form today to allow me to discuss his genetic test results with Arpita when they are available to allow for a complete risk assessment for her.     Plan:  1) Arpita elected not to proceed with genetic testing today and to wait until we have her father's results for review.  At that time we will discuss genetic testing for her if necessary and review medical management recommendations.    Face to face time: 30 minutes    Saritha Ureña MS, [facility]  Licensed Genetic Counselor  Office: [phone]

## 2019-12-30 DIAGNOSIS — J10.1 INFLUENZA B: Primary | ICD-10-CM

## 2019-12-30 RX ORDER — OSELTAMIVIR PHOSPHATE 75 MG/1
75 CAPSULE ORAL DAILY
Qty: 10 CAPSULE | Refills: 0 | Status: SHIPPED | OUTPATIENT
Start: 2019-12-30 | End: 2020-01-09

## 2020-01-30 ENCOUNTER — MEDICAL CORRESPONDENCE (OUTPATIENT)
Dept: HEALTH INFORMATION MANAGEMENT | Facility: CLINIC | Age: 35
End: 2020-01-30

## 2020-03-10 ENCOUNTER — HEALTH MAINTENANCE LETTER (OUTPATIENT)
Age: 35
End: 2020-03-10

## 2020-03-30 ENCOUNTER — MEDICAL CORRESPONDENCE (OUTPATIENT)
Dept: HEALTH INFORMATION MANAGEMENT | Facility: CLINIC | Age: 35
End: 2020-03-30

## 2020-05-21 ENCOUNTER — RESULTS ONLY (OUTPATIENT)
Dept: LAB | Age: 35
End: 2020-05-21

## 2020-05-21 ENCOUNTER — APPOINTMENT (OUTPATIENT)
Dept: URGENT CARE | Facility: URGENT CARE | Age: 35
End: 2020-05-21
Payer: COMMERCIAL

## 2020-06-01 LAB
SARS-COV-2 RNA SPEC QL NAA+PROBE: NOT DETECTED
SPECIMEN SOURCE: NORMAL

## 2020-08-21 ENCOUNTER — OFFICE VISIT (OUTPATIENT)
Dept: FAMILY MEDICINE | Facility: CLINIC | Age: 35
End: 2020-08-21
Payer: COMMERCIAL

## 2020-08-21 VITALS
WEIGHT: 163 LBS | DIASTOLIC BLOOD PRESSURE: 69 MMHG | SYSTOLIC BLOOD PRESSURE: 110 MMHG | BODY MASS INDEX: 26.31 KG/M2 | TEMPERATURE: 98.7 F | OXYGEN SATURATION: 100 % | HEART RATE: 63 BPM

## 2020-08-21 DIAGNOSIS — R61 NIGHT SWEATS: ICD-10-CM

## 2020-08-21 DIAGNOSIS — Z13.6 SCREENING FOR HEART DISEASE: ICD-10-CM

## 2020-08-21 DIAGNOSIS — R09.89 THROAT FULLNESS: ICD-10-CM

## 2020-08-21 DIAGNOSIS — Z13.1 SCREENING FOR DIABETES MELLITUS: ICD-10-CM

## 2020-08-21 DIAGNOSIS — B37.0 ORAL THRUSH: ICD-10-CM

## 2020-08-21 DIAGNOSIS — G44.89 OTHER HEADACHE SYNDROME: ICD-10-CM

## 2020-08-21 DIAGNOSIS — E55.9 VITAMIN D DEFICIENCY: ICD-10-CM

## 2020-08-21 DIAGNOSIS — R13.11 ORAL PHASE DYSPHAGIA: ICD-10-CM

## 2020-08-21 DIAGNOSIS — R13.11 ORAL PHASE DYSPHAGIA: Primary | ICD-10-CM

## 2020-08-21 LAB
CHOLEST SERPL-MCNC: 173 MG/DL
DEPRECATED CALCIDIOL+CALCIFEROL SERPL-MC: 45 UG/L (ref 20–75)
ERYTHROCYTE [DISTWIDTH] IN BLOOD BY AUTOMATED COUNT: 11.5 % (ref 10–15)
GLUCOSE SERPL-MCNC: 86 MG/DL (ref 70–99)
HCT VFR BLD AUTO: 41.9 % (ref 35–47)
HDLC SERPL-MCNC: 79 MG/DL
HGB BLD-MCNC: 13.7 G/DL (ref 11.7–15.7)
LDLC SERPL CALC-MCNC: 80 MG/DL
MCH RBC QN AUTO: 30.9 PG (ref 26.5–33)
MCHC RBC AUTO-ENTMCNC: 32.7 G/DL (ref 31.5–36.5)
MCV RBC AUTO: 95 FL (ref 78–100)
NONHDLC SERPL-MCNC: 94 MG/DL
PLATELET # BLD AUTO: 230 10E9/L (ref 150–450)
RBC # BLD AUTO: 4.43 10E12/L (ref 3.8–5.2)
TRIGL SERPL-MCNC: 69 MG/DL
TSH SERPL DL<=0.005 MIU/L-ACNC: 3.55 MU/L (ref 0.4–4)
WBC # BLD AUTO: 6.6 10E9/L (ref 4–11)

## 2020-08-21 RX ORDER — NYSTATIN 100000/ML
500000 SUSPENSION, ORAL (FINAL DOSE FORM) ORAL 4 TIMES DAILY
Qty: 280 ML | Refills: 0 | Status: SHIPPED | OUTPATIENT
Start: 2020-08-21 | End: 2020-09-04

## 2020-08-21 RX ORDER — ALPRAZOLAM 0.25 MG
TABLET ORAL PRN
COMMUNITY
Start: 2020-06-15 | End: 2022-04-06

## 2020-08-21 ASSESSMENT — PAIN SCALES - GENERAL: PAINLEVEL: NO PAIN (0)

## 2020-08-21 NOTE — NURSING NOTE
Chief Complaint   Patient presents with     Sweats     Pt has night sweats, sore throat, oral thrush and headaches.      Depression Response    Patient completed the PHQ-9 assessment for depression and scored >9? No  Question 9 on the PHQ-9 was positive for suicidality? No    I personally notified the following: visit provider    SUNIL Sanders 8:15 AM  8/21/2020

## 2020-08-21 NOTE — PROGRESS NOTES
HPI       Sarah Schlatter is a 35 year old woman who presents as a new patient with multiple concerns:  Chief Complaint   Patient presents with     Physical     Sweats     Pt has night sweats and sore throat   Difficulty swallowing/throat fullness: Arpita has been feeling like something is in her throat for the past 3 months. She has had 2 Covid tests in the past few months. The first was negative, no result on second test yet. No family history of thyroid disease. She has not had tonsil stones in the past. She did remove a tonsil stone yesterday. She does have a history of recurrent sinus infections.   Night sweats/headaches: IUD removed one month ago. Tried the Nuva Ring for a few days, she did not like it at all; now she is on oral birth control.   Headache: History of migraine issues in high school, has been ok until 5 days ago, last Sunday when she experienced the worse headache of her life.     Problem, Medication and Allergy Lists were reviewed and updated if needed.    Patient is not an established patient of this clinic.         Review of Systems:   Review of Systems     Constitutional:  Negative for fever, chills and fatigue.   HENT:  Positive for sore throat.    Neurological:  Positive for headaches.               Physical Exam:     Vitals:    08/21/20 0806   BP: 110/69   Pulse: 63   Temp: 98.7  F (37.1  C)   TempSrc: Oral   SpO2: 100%   Weight: 73.9 kg (163 lb)     Body mass index is 26.31 kg/m .  Vitals were reviewed and were normal.     Physical Exam  Vitals signs reviewed.   Constitutional:       Appearance: Normal appearance.   HENT:      Head: Normocephalic.      Right Ear: Hearing, tympanic membrane, ear canal and external ear normal.      Left Ear: Hearing, tympanic membrane, ear canal and external ear normal.      Nose: Nose normal.      Mouth/Throat:      Lips: Pink.      Mouth: Mucous membranes are moist.      Pharynx: Oropharynx is clear. Uvula midline. Posterior oropharyngeal erythema  present. No oropharyngeal exudate.      Comments: Oral thrush present.   Musculoskeletal: Normal range of motion.   Skin:     General: Skin is warm and dry.   Neurological:      General: No focal deficit present.      Mental Status: She is alert and oriented to person, place, and time.   Psychiatric:         Mood and Affect: Mood normal.         Behavior: Behavior normal.           Results:     Labs pending.     Assessment and Plan     1. Oral phase dysphagia    - CBC with platelets; Future  - OTOLARYNGOLOGY REFERRAL    2. Throat fullness    - CBC with platelets; Future  - OTOLARYNGOLOGY REFERRAL    3. Night sweats    - TSH; Future  - CBC with platelets; Future    4. Other headache syndrome      5. Oral thrush    - nystatin (MYCOSTATIN) 450675 UNIT/ML suspension; Take 5 mLs (500,000 Units) by mouth 4 times daily for 14 days  Dispense: 280 mL; Refill: 0    6. Vitamin D deficiency    - Vitamin D Deficiency; Future    7. Screening for heart disease    - Lipid Profile FASTING; Future    8. Screening for diabetes mellitus    - Glucose; Future      There are no discontinued medications.  First, please have your lab work completed today. Next, please schedule with ENT for exploration of your throat symptoms. Next, follow up with me via video visit to discuss results and plan going forward for treatment. Thank you.Options for treatment and follow-up care were reviewed with the patient. Sarah Schlatter  engaged in the decision making process and verbalized understanding of the options discussed and agreed with the final plan.  Rima Jo, APRN, CNP

## 2020-08-21 NOTE — PATIENT INSTRUCTIONS
First, please have your lab work completed today. Next, please schedule with ENT for exploration of your throat symptoms. Next, follow up with me via video visit to discuss results and plan going forward for treatment. Thank you.  [facility] Medication Refill Request Information:  * Please contact your pharmacy regarding ANY request for medication refills.  ** [facility] Clinic Prescription Fax = [phone]  * Please allow 3 business days for routine medication refills.  * Please allow 5 business days for controlled substance medication refills.     [facility] Test Result notification information:  *You will be notified with in 7-10 days of your appointment day regarding the results of your test.  If you are on MyChart you will be notified as soon as the provider has reviewed the results and signed off on them.    [facility]: [phone]

## 2020-08-24 ENCOUNTER — TELEPHONE (OUTPATIENT)
Dept: OTOLARYNGOLOGY | Facility: CLINIC | Age: 35
End: 2020-08-24

## 2020-08-24 NOTE — TELEPHONE ENCOUNTER
Called patient to schedule, per Rima RAMIREZ RN:    Appointment Type - [facility] NEW  Provider - Dr. Ochoa  Appointment Notes - Anterior oropharyngeal irritation, referred by Rima Jo (NP Clinic)    M with scheduling instructions and the ENT call center number. Per Rima RAMIREZ, pt is welcome to schedule in next available [facility] NEW slot of Dr. Ochoa's.

## 2020-08-24 NOTE — TELEPHONE ENCOUNTER
Health Call Center    Phone Message    May a detailed message be left on voicemail: yes     Reason for Call: Appointment Intake    Referring Provider Name: Rima Jo NP in [facility]  Diagnosis and/or Symptoms: Oral phase dysphagia ; Throat fullness     New Pt protocols for dysphagia say clinic review (encounter) - please review Referral & Records in [facility] and call Pt to schedule - she is available any day and any time to come in and knows it most like is a Panel with an MD and an SLP provider - Thanks    Action Taken: Message routed to:  [facility] ENT    Travel Screening: Not Applicable

## 2020-08-24 NOTE — TELEPHONE ENCOUNTER
[facility] Call Center    Phone Message    May a detailed message be left on voicemail: yes     Reason for Call: Other: disregard - saw message in chart to schedule with Dr Ochoa     Action Taken: Message routed to:  [facility] ENT    Travel Screening: Not Applicable

## 2020-08-25 NOTE — TELEPHONE ENCOUNTER
FUTURE VISIT INFORMATION      FUTURE VISIT INFORMATION:    Date: 10/13/20    Time: 9:15 AM    Location: [facility] [place]  REFERRAL INFORMATION:    Referring provider:  Rima Jo NP    Referring providers clinic:  [facility]    Reason for visit/diagnosis: Anterior oropharyngeal irritation    RECORDS REQUESTED FROM:       Clinic name Comments Records Status Imaging Status   [facility] 8/21/20 - PCC OV with Rima Jo NP  6/8/16 - ENT OV with Dr. Wellington Marti

## 2020-08-27 ENCOUNTER — VIRTUAL VISIT (OUTPATIENT)
Dept: FAMILY MEDICINE | Facility: CLINIC | Age: 35
End: 2020-08-27
Payer: COMMERCIAL

## 2020-08-27 DIAGNOSIS — R13.11 ORAL PHASE DYSPHAGIA: Primary | ICD-10-CM

## 2020-08-27 DIAGNOSIS — F41.1 GENERALIZED ANXIETY DISORDER: ICD-10-CM

## 2020-08-27 DIAGNOSIS — B37.0 ORAL THRUSH: ICD-10-CM

## 2020-08-27 ASSESSMENT — ANXIETY QUESTIONNAIRES
6. BECOMING EASILY ANNOYED OR IRRITABLE: MORE THAN HALF THE DAYS
IF YOU CHECKED OFF ANY PROBLEMS ON THIS QUESTIONNAIRE, HOW DIFFICULT HAVE THESE PROBLEMS MADE IT FOR YOU TO DO YOUR WORK, TAKE CARE OF THINGS AT HOME, OR GET ALONG WITH OTHER PEOPLE: SOMEWHAT DIFFICULT
GAD7 TOTAL SCORE: 5
5. BEING SO RESTLESS THAT IT IS HARD TO SIT STILL: NOT AT ALL
3. WORRYING TOO MUCH ABOUT DIFFERENT THINGS: NOT AT ALL
7. FEELING AFRAID AS IF SOMETHING AWFUL MIGHT HAPPEN: NOT AT ALL
2. NOT BEING ABLE TO STOP OR CONTROL WORRYING: SEVERAL DAYS
1. FEELING NERVOUS, ANXIOUS, OR ON EDGE: SEVERAL DAYS

## 2020-08-27 ASSESSMENT — PATIENT HEALTH QUESTIONNAIRE - PHQ9
SUM OF ALL RESPONSES TO PHQ QUESTIONS 1-9: 8
5. POOR APPETITE OR OVEREATING: SEVERAL DAYS

## 2020-08-27 NOTE — PATIENT INSTRUCTIONS
First, take the Nystatin till its gone. Next, please perform 5 healthy habits. Next, please consider a probiotic daily, L thianine 100 mg daily and Vit d 1000 mg daily. Finally, please follow up as needed for concerns and notify the clinic if you want to proceed with Buspirone to treat your anxiety.   [facility] Medication Refill Request Information:  * Please contact your pharmacy regarding ANY request for medication refills.  ** [facility] Clinic Prescription Fax = [phone]  * Please allow 3 business days for routine medication refills.  * Please allow 5 business days for controlled substance medication refills.     [facility] Test Result notification information:  *You will be notified with in 7-10 days of your appointment day regarding the results of your test.  If you are on MyChart you will be notified as soon as the provider has reviewed the results and signed off on them.    [facility]: [phone]

## 2020-08-27 NOTE — PROGRESS NOTES
"Sarah Schlatter is a 35 year old female who is being evaluated via a billable video visit.      The patient has been notified of following:     \"This video visit will be conducted via a call between you and your physician/provider. We have found that certain health care needs can be provided without the need for an in-person physical exam.  This service lets us provide the care you need with a video conversation.  If a prescription is necessary we can send it directly to your pharmacy.  If lab work is needed we can place an order for that and you can then stop by our lab to have the test done at a later time.    Video visits are billed at different rates depending on your insurance coverage.  Please reach out to your insurance provider with any questions.    If during the course of the call the physician/provider feels a video visit is not appropriate, you will not be charged for this service.\"    Patient has given verbal consent for Video visit? Yes  How would you like to obtain your AVS? MyChart  If you are dropped from the video visit, the video invite should be resent to: Send to e-mail at: [email or web address]  Will anyone else be joining your video visit? No      Subjective     Sarah Schlatter is a 35 year old female who presents today via video visit for the following health issues:    HPI  Oral phase dysphagia: This continues. She has not had any additional tonsil stones.   Oral thrush:Her tongue does feel better since starting the Nystatin. She also performs warm salt water rinses.   Depression and Anxiety: She was taking 200 mg Zoloft daily, this was weaned down to 150 mg daily. She feels her anxiety is worse than her sadness. She does not feel that 200 mg of Zoloft has helped her anxiety. She does not wish to take this dose again. She would prefer to stay at 200 mg daily.   Takes Melatonin for sleep, 10-20 mg daily.     Video Start Time: 0920    Review of Systems   Constitutional, HEENT, " cardiovascular, pulmonary, gi and gu systems are negative, except as otherwise noted.      Objective      Vitals:  No vitals were obtained today due to virtual visit.    Physical Exam  Vitals signs reviewed.   Constitutional:       Appearance: Normal appearance.   HENT:      Head: Normocephalic.   Neurological:      General: No focal deficit present.      Mental Status: She is alert and oriented to person, place, and time.   Psychiatric:         Mood and Affect: Mood normal.         Behavior: Behavior normal.        Component      Latest Ref Rng & Units 8/21/2020   WBC      4.0 - 11.0 10e9/L 6.6   RBC Count      3.8 - 5.2 10e12/L 4.43   Hemoglobin      11.7 - 15.7 g/dL 13.7   Hematocrit      35.0 - 47.0 % 41.9   MCV      78 - 100 fl 95   MCH      26.5 - 33.0 pg 30.9   MCHC      31.5 - 36.5 g/dL 32.7   RDW      10.0 - 15.0 % 11.5   Platelet Count      150 - 450 10e9/L 230   Cholesterol      <200 mg/dL 173   Triglycerides      <150 mg/dL 69   HDL Cholesterol      >49 mg/dL 79   LDL Cholesterol Calculated      <100 mg/dL 80   Non HDL Cholesterol      <130 mg/dL 94   Glucose      70 - 99 mg/dL 86   Vitamin D Deficiency screening      20 - 75 ug/L 45   TSH      0.40 - 4.00 mU/L 3.55     RADHA-7 SCORE 8/27/2020   Total Score 5       PHQ 8/27/2020   PHQ-9 Total Score 8   Q9: Thoughts of better off dead/self-harm past 2 weeks Not at all       Assessment & Plan     Oral phase dysphagia      Oral thrush      Post partum depression      Generalized anxiety disorder    Return in about 6 months (around 2/27/2021), or if symptoms worsen or fail to improve, for In-Clinic Visit.    Rima Jo NP  [facility]      Video-Visit Details    Type of service:  Video Visit    Video End Time:0941    Originating Location (pt. Location): Home    Distant Location (provider location):  [facility]     Platform used for Video Visit: Jose Alejandro   Patient instructions: First, take the Nystatin till its gone. Next, please perform 5 healthy  habits. Next, please consider a probiotic daily, L thianine 100 mg daily and Vit d 1000 mg daily. Finally, please follow up as needed for concerns and notify the clinic if you want to proceed with Buspirone to treat your anxiety.   ETIENNE Cooley, CNP

## 2020-08-27 NOTE — NURSING NOTE
35 year old  No chief complaint on file.      unknown if currently breastfeeding. There is no height or weight on file to calculate BMI.  BP completed using cuff size:    Patient Active Problem List   Diagnosis     Decreased fetal movement       Wt Readings from Last 2 Encounters:   08/21/20 73.9 kg (163 lb)   03/31/18 86.2 kg (190 lb)     BP Readings from Last 3 Encounters:   08/21/20 110/69   03/31/18 118/67   03/29/17 108/67       No Known Allergies    Current Outpatient Medications   Medication     ALPRAZolam (XANAX) 0.25 MG tablet     Levonorgestrel-Ethinyl Estrad (LUTERA PO)     nystatin (MYCOSTATIN) 874093 UNIT/ML suspension     sertraline (ZOLOFT) 25 MG tablet     mupirocin (BACTROBAN) 2 % ointment     Prenatal Vit-Fe Fumarate-FA (PRENATAL MULTIVITAMIN  PLUS IRON) 27-0.8 MG TABS per tablet     No current facility-administered medications for this visit.        Social History     Tobacco Use     Smoking status: Never Smoker     Smokeless tobacco: Never Used   Substance Use Topics     Alcohol use: No     Alcohol/week: 0.0 standard drinks     Comment: occassional     Drug use: No       Honoring Choices - Health Care Directive Guide offered to patient at time of visit.    Health Maintenance Due   Topic Date Due     PREVENTIVE CARE VISIT  1986     HIV SCREENING  04/20/2001     PAP  04/20/2007     DTAP/TDAP/TD IMMUNIZATION (1 - Tdap) 04/20/2011     INFLUENZA VACCINE (1) 09/01/2020         There is no immunization history on file for this patient.    No results found for: PAP      Recent Labs   Lab Test 08/21/20  0907   LDL 80   HDL 79   TRIG 69   TSH 3.55       PHQ-2 ( 1999 Pfizer) 8/27/2020 8/21/2020   Q1: Little interest or pleasure in doing things 0 0   Q2: Feeling down, depressed or hopeless 0 0   PHQ-2 Score 0 0       PHQ-9 SCORE 8/27/2020   PHQ-9 Total Score 8       RADHA-7 SCORE 8/27/2020   Total Score 5       No flowsheet data found.        Maisha Cardoso CMA  August 27, 2020 9:20 AM

## 2020-08-28 ASSESSMENT — ENCOUNTER SYMPTOMS
SORE THROAT: 1
FEVER: 0
CHILLS: 0
FATIGUE: 0
HEADACHES: 1

## 2020-08-28 ASSESSMENT — ANXIETY QUESTIONNAIRES: GAD7 TOTAL SCORE: 5

## 2020-09-08 ENCOUNTER — NURSE TRIAGE (OUTPATIENT)
Dept: CARDIOLOGY | Facility: CLINIC | Age: 35
End: 2020-09-08

## 2020-09-08 NOTE — TELEPHONE ENCOUNTER
Call transferred from scheduling to discuss symptoms and new patient appointment scheduled 10/5/20 with Dr. Ruth. Spoke to patient who states she started experiencing intermittent palpitations that started and lasted all last weekend. Patient denies having any currently. Patient describes palpitations and fluttering with a fast heart rate that vary with the shortest episode lasting approximately 30 seconds and the longest episode lasting 1 minute 30 seconds. Patient denies having any shortness of breath, chest pain or pressure, dizziness when these episodes occur. Advised to keep appointment and continue to monitor these episodes and if they should worsen or develops additional symptoms mentioned above, to go to the ED for an evaluation. Patient verbalized agreement and understanding.

## 2020-10-04 NOTE — PROGRESS NOTES
HPI and Plan:   See dictation    No orders of the defined types were placed in this encounter.      No orders of the defined types were placed in this encounter.      There are no discontinued medications.      No diagnosis found.    CURRENT MEDICATIONS:  Current Outpatient Medications   Medication Sig Dispense Refill     ALPRAZolam (XANAX) 0.25 MG tablet        Levonorgestrel-Ethinyl Estrad (LUTERA PO)        sertraline (ZOLOFT) 25 MG tablet Take 1 tablet (25 mg) by mouth daily Take 25 mg by mouth daily for 7 days, then increase to 50 mg by mouth daily if tolerated well. (Patient taking differently: Take 150 mg by mouth daily Take 25 mg by mouth daily for 7 days, then increase to 50 mg by mouth daily if tolerated well.) 30 tablet 1       ALLERGIES   No Known Allergies    PAST MEDICAL HISTORY:  Past Medical History:   Diagnosis Date     Anxiety      Depressive disorder      Dysphagia      Palpitations        PAST SURGICAL HISTORY:  Past Surgical History:   Procedure Laterality Date     C/SECTION, LOW TRANSVERSE Bilateral 2017    , Low Transverse       FAMILY HISTORY:  Family History   Problem Relation Age of Onset     Cancer Father      Depression Sister        SOCIAL HISTORY:  Social History     Socioeconomic History     Marital status:      Spouse name: Not on file     Number of children: Not on file     Years of education: Not on file     Highest education level: Not on file   Occupational History     Not on file   Social Needs     Financial resource strain: Not on file     Food insecurity     Worry: Not on file     Inability: Not on file     Transportation needs     Medical: Not on file     Non-medical: Not on file   Tobacco Use     Smoking status: Never Smoker     Smokeless tobacco: Never Used   Substance and Sexual Activity     Alcohol use: No     Alcohol/week: 0.0 standard drinks     Comment: occassional     Drug use: No     Sexual activity: Yes     Partners: Male   Lifestyle      Physical activity     Days per week: Not on file     Minutes per session: Not on file     Stress: Not on file   Relationships     Social connections     Talks on phone: Not on file     Gets together: Not on file     Attends Yarsani service: Not on file     Active member of club or organization: Not on file     Attends meetings of clubs or organizations: Not on file     Relationship status: Not on file     Intimate partner violence     Fear of current or ex partner: Not on file     Emotionally abused: Not on file     Physically abused: Not on file     Forced sexual activity: Not on file   Other Topics Concern     Not on file   Social History Narrative     Not on file       Review of Systems:  Skin:          Eyes:         ENT:         Respiratory:          Cardiovascular:         Gastroenterology:        Genitourinary:         Musculoskeletal:         Neurologic:         Psychiatric:         Heme/Lymph/Imm:         Endocrine:           Physical Exam:  Vitals: There were no vitals taken for this visit.    Constitutional:  cooperative, alert and oriented, well developed, well nourished, in no acute distress        Skin:  warm and dry to the touch          Head:           Eyes:  pupils equal and round        Lymph:      ENT:  no pallor or cyanosis, dentition good        Neck:  carotid pulses are full and equal bilaterally        Respiratory:            Cardiac: regular rhythm                pulses full and equal, no bruits auscultated                                        GI:  abdomen soft        Extremities and Muscular Skeletal:  no deformities, clubbing, cyanosis, erythema observed              Neurological:  no gross motor deficits        Psych:  Alert and Oriented x 3        CC  Rima Jo NP  [facility] [address], [address]  [place] [address]

## 2020-10-05 ENCOUNTER — OFFICE VISIT (OUTPATIENT)
Dept: CARDIOLOGY | Facility: CLINIC | Age: 35
End: 2020-10-05
Payer: COMMERCIAL

## 2020-10-05 VITALS
BODY MASS INDEX: 24.44 KG/M2 | HEART RATE: 77 BPM | SYSTOLIC BLOOD PRESSURE: 112 MMHG | WEIGHT: 165 LBS | DIASTOLIC BLOOD PRESSURE: 76 MMHG | HEIGHT: 69 IN

## 2020-10-05 DIAGNOSIS — R00.2 PALPITATIONS: Primary | ICD-10-CM

## 2020-10-05 PROCEDURE — 93000 ELECTROCARDIOGRAM COMPLETE: CPT | Performed by: INTERNAL MEDICINE

## 2020-10-05 PROCEDURE — 99204 OFFICE O/P NEW MOD 45 MIN: CPT | Mod: 25 | Performed by: INTERNAL MEDICINE

## 2020-10-05 ASSESSMENT — MIFFLIN-ST. JEOR: SCORE: 1512.82

## 2020-10-05 NOTE — LETTER
10/5/2020      Rima Jo NP  [facility] [address]  [facility] [place] [facility] [address]      RE: Arpita Schlatter       Dear Colleague,    I had the pleasure of seeing Sarah Schlatter in the [facility].    Service Date: 10/05/2020      CARDIOLOGY CONSULT       REASON FOR CONSULTATION:  Palpitations, lightheadedness.      HISTORY OF PRESENT ILLNESS:  I had the pleasure of seeing Ms. Schlatter in consultation at the [facility] today.  She is a very pleasant, 35-year-old female who is the wife of one of my patients, Charles Schlatter.  She is referred today due to concerns about palpitations and lightheadedness over the past 4-6 weeks.      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  Over the past 4-6 weeks, she has noted palpitations that last approximately 5-10 seconds on an almost daily basis.  They are not associated with dizziness or lightheadedness, but she has had other episodes of lightheadedness, which she attributes to being orthostatic in nature.  These have also become more frequent over the last 4-6 weeks.  She is a very active individual who has 3 small children at home and denies any exertional chest discomfort, syncope or presyncope.  She denies any PND, orthopnea or lower extremity edema.  There is no family history of sudden cardiac death.      Her past medical history, family history, social history and medications are reviewed in my Epic note.      ALLERGIES:  Reviewed in my Epic note.      PHYSICAL EXAMINATION:  Dictated below.      IMAGING:  An EKG performed today demonstrated normal sinus rhythm with no acute ST-T wave abnormalities and normal axis and intervals.      LABORATORY:  I did review her labs performed on 08/21/2020.  These included a TSH, vitamin D level, CBC and glucose testing.  All these were within normal limits.  A lipid panel the same day demonstrated a total cholesterol of 173,  HDL of 79, LDL of 80 and triglycerides of 69.      IMPRESSION:  Palpitations, most likely secondary to symptomatic PACs/PVCs.      Ms. Schlatter presents for an evaluation regarding palpitations that have been ongoing for the past 4-6 weeks.  As I discussed with the patient, the most likely etiology of these palpitations is PACs/PVCs or short runs of SVT.  As I explained to her, the significance of these findings is dependent on the presence or absence of any underlying structural heart disease.  Therefore, I recommended a stress echocardiogram to evaluate for any significant cardiac pathology as well as to ensure that she has an appropriate chronotropic response to exercise.      I have also ordered a Zio patch monitor to be placed after the stress echocardiogram to precisely identify the nature of her palpitations.      Once we have a precise diagnosis, we can decide if therapeutic measures are indicated.  I will contact her once the results of the above-mentioned investigations are available to continue this discussion.      It was a pleasure seeing her in consultation.         CJ DIANA MD             D: 10/05/2020   T: 10/05/2020   MT: [email or web address]      Name:     SCHLATTER, SARAH   MRN:      [id number]        Account:      [id number]   :      1985           Service Date: 10/05/2020      Document: [id number]        Outpatient Encounter Medications as of 10/5/2020   Medication Sig Dispense Refill     ALPRAZolam (XANAX) 0.25 MG tablet        Levonorgestrel-Ethinyl Estrad (LUTERA PO)        sertraline (ZOLOFT) 25 MG tablet Take 1 tablet (25 mg) by mouth daily Take 25 mg by mouth daily for 7 days, then increase to 50 mg by mouth daily if tolerated well. (Patient taking differently: Take 150 mg by mouth daily Take 25 mg by mouth daily for 7 days, then increase to 50 mg by mouth daily if tolerated well.) 30 tablet 1     No facility-administered encounter medications on file as of 10/5/2020.        Again,  thank you for allowing me to participate in the care of your patient.      Sincerely,    Elvin Ruth MD     [facility]

## 2020-10-05 NOTE — PROGRESS NOTES
Service Date: 10/05/2020      CARDIOLOGY CONSULT       REASON FOR CONSULTATION:  Palpitations, lightheadedness.      HISTORY OF PRESENT ILLNESS:  I had the pleasure of seeing Ms. Schlatter in consultation at the [facility] today.  She is a very pleasant, 35-year-old female who is the wife of one of my patients, Charles Schlatter.  She is referred today due to concerns about palpitations and lightheadedness over the past 4-6 weeks.      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  Over the past 4-6 weeks, she has noted palpitations that last approximately 5-10 seconds on an almost daily basis.  They are not associated with dizziness or lightheadedness, but she has had other episodes of lightheadedness, which she attributes to being orthostatic in nature.  These have also become more frequent over the last 4-6 weeks.  She is a very active individual who has 3 small children at home and denies any exertional chest discomfort, syncope or presyncope.  She denies any PND, orthopnea or lower extremity edema.  There is no family history of sudden cardiac death.      Her past medical history, family history, social history and medications are reviewed in my Epic note.      ALLERGIES:  Reviewed in my Epic note.      PHYSICAL EXAMINATION:  Dictated below.      IMAGING:  An EKG performed today demonstrated normal sinus rhythm with no acute ST-T wave abnormalities and normal axis and intervals.      LABORATORY:  I did review her labs performed on 08/21/2020.  These included a TSH, vitamin D level, CBC and glucose testing.  All these were within normal limits.  A lipid panel the same day demonstrated a total cholesterol of 173, HDL of 79, LDL of 80 and triglycerides of 69.      IMPRESSION:  Palpitations, most likely secondary to symptomatic PACs/PVCs.      Ms. Schlatter presents for an evaluation regarding palpitations that have been ongoing for the past 4-6 weeks.  As I  discussed with the patient, the most likely etiology of these palpitations is PACs/PVCs or short runs of SVT.  As I explained to her, the significance of these findings is dependent on the presence or absence of any underlying structural heart disease.  Therefore, I recommended a stress echocardiogram to evaluate for any significant cardiac pathology as well as to ensure that she has an appropriate chronotropic response to exercise.      I have also ordered a Zio patch monitor to be placed after the stress echocardiogram to precisely identify the nature of her palpitations.      Once we have a precise diagnosis, we can decide if therapeutic measures are indicated.  I will contact her once the results of the above-mentioned investigations are available to continue this discussion.      It was a pleasure seeing her in consultation.         CJ DIANA MD             D: 10/05/2020   T: 10/05/2020   MT: nakul      Name:     SCHLATTER, SARAH   MRN:      [id number]        Account:      [id number]   :      1985           Service Date: 10/05/2020      Document: [id number]

## 2020-10-05 NOTE — LETTER
10/5/2020    Rima Jo NP  [facility] [address], [address]  [facility] [address]    RE: Sarah Schlatter       Dear Colleague,    I had the pleasure of seeing Sarah Schlatter in the [facility].    HPI and Plan:   See dictation    No orders of the defined types were placed in this encounter.      No orders of the defined types were placed in this encounter.      There are no discontinued medications.      No diagnosis found.    CURRENT MEDICATIONS:  Current Outpatient Medications   Medication Sig Dispense Refill     ALPRAZolam (XANAX) 0.25 MG tablet        Levonorgestrel-Ethinyl Estrad (LUTERA PO)        sertraline (ZOLOFT) 25 MG tablet Take 1 tablet (25 mg) by mouth daily Take 25 mg by mouth daily for 7 days, then increase to 50 mg by mouth daily if tolerated well. (Patient taking differently: Take 150 mg by mouth daily Take 25 mg by mouth daily for 7 days, then increase to 50 mg by mouth daily if tolerated well.) 30 tablet 1       ALLERGIES   No Known Allergies    PAST MEDICAL HISTORY:  Past Medical History:   Diagnosis Date     Anxiety      Depressive disorder      Dysphagia      Palpitations        PAST SURGICAL HISTORY:  Past Surgical History:   Procedure Laterality Date     C/SECTION, LOW TRANSVERSE Bilateral 2017    , Low Transverse       FAMILY HISTORY:  Family History   Problem Relation Age of Onset     Cancer Father      Depression Sister        SOCIAL HISTORY:  Social History     Socioeconomic History     Marital status:      Spouse name: Not on file     Number of children: Not on file     Years of education: Not on file     Highest education level: Not on file   Occupational History     Not on file   Social Needs     Financial resource strain: Not on file     Food insecurity     Worry: Not on file     Inability: Not on file     Transportation needs     Medical: Not on file     Non-medical: Not on file   Tobacco Use     Smoking  status: Never Smoker     Smokeless tobacco: Never Used   Substance and Sexual Activity     Alcohol use: No     Alcohol/week: 0.0 standard drinks     Comment: occassional     Drug use: No     Sexual activity: Yes     Partners: Male   Lifestyle     Physical activity     Days per week: Not on file     Minutes per session: Not on file     Stress: Not on file   Relationships     Social connections     Talks on phone: Not on file     Gets together: Not on file     Attends Hinduism service: Not on file     Active member of club or organization: Not on file     Attends meetings of clubs or organizations: Not on file     Relationship status: Not on file     Intimate partner violence     Fear of current or ex partner: Not on file     Emotionally abused: Not on file     Physically abused: Not on file     Forced sexual activity: Not on file   Other Topics Concern     Not on file   Social History Narrative     Not on file       Review of Systems:  Skin:          Eyes:         ENT:         Respiratory:          Cardiovascular:         Gastroenterology:        Genitourinary:         Musculoskeletal:         Neurologic:         Psychiatric:         Heme/Lymph/Imm:         Endocrine:           Physical Exam:  Vitals: There were no vitals taken for this visit.    Constitutional:  cooperative, alert and oriented, well developed, well nourished, in no acute distress        Skin:  warm and dry to the touch          Head:           Eyes:  pupils equal and round        Lymph:      ENT:  no pallor or cyanosis, dentition good        Neck:  carotid pulses are full and equal bilaterally        Respiratory:            Cardiac: regular rhythm                pulses full and equal, no bruits auscultated                                        GI:  abdomen soft        Extremities and Muscular Skeletal:  no deformities, clubbing, cyanosis, erythema observed              Neurological:  no gross motor deficits        Psych:  Alert and Oriented x 3         CC  Rima Jo NP  [facility] [address], [address]  [place] [phone]                  Thank you for allowing me to participate in the care of your patient.      Sincerely,     Elvin Ruth MD     [facility]    cc:   Rima Jo NP  [facility] [address], [address]  [place] [phone]

## 2020-10-08 ENCOUNTER — HOSPITAL ENCOUNTER (OUTPATIENT)
Dept: CARDIOLOGY | Facility: CLINIC | Age: 35
Discharge: HOME | End: 2020-10-08
Attending: INTERNAL MEDICINE | Admitting: INTERNAL MEDICINE
Payer: COMMERCIAL

## 2020-10-08 ENCOUNTER — TELEPHONE (OUTPATIENT)
Dept: CARDIOLOGY | Facility: CLINIC | Age: 35
End: 2020-10-08

## 2020-10-08 DIAGNOSIS — R00.2 PALPITATIONS: ICD-10-CM

## 2020-10-08 PROCEDURE — 93321 DOPPLER ECHO F-UP/LMTD STD: CPT | Mod: 26 | Performed by: INTERNAL MEDICINE

## 2020-10-08 PROCEDURE — 0298T LEADLESS EKG MONITOR 3 TO 14 DAYS: CPT | Performed by: INTERNAL MEDICINE

## 2020-10-08 PROCEDURE — 93016 CV STRESS TEST SUPVJ ONLY: CPT | Performed by: INTERNAL MEDICINE

## 2020-10-08 PROCEDURE — 93321 DOPPLER ECHO F-UP/LMTD STD: CPT | Mod: TC

## 2020-10-08 PROCEDURE — 93018 CV STRESS TEST I&R ONLY: CPT | Performed by: INTERNAL MEDICINE

## 2020-10-08 PROCEDURE — 93325 DOPPLER ECHO COLOR FLOW MAPG: CPT | Mod: 26 | Performed by: INTERNAL MEDICINE

## 2020-10-08 PROCEDURE — 93350 STRESS TTE ONLY: CPT | Mod: 26 | Performed by: INTERNAL MEDICINE

## 2020-10-08 PROCEDURE — 0296T LEADLESS EKG MONITOR 3 TO 14 DAYS: CPT

## 2020-10-08 NOTE — TELEPHONE ENCOUNTER
Exercise stress echo completed today, see below. Ordered by Dr Ruth at OV 10/5/20 for palpitations. Ziopatch pending. Routed to Dr Ruth for review.     Developed LBBB conduction at HR about 150- noted sensation of palpitations  with that, though no arrythmia. LBBB resolved after HR < 100 in recovery.  The stress EKG is non-diagnostic due to pre-existing EKG abnomalities.  Normal resting wall motion and no stress-induced wall motion abnormality.  Mild distal septal dysynchrony at peak c/w conduction abnormality. Does not  appear like an ischemic WMA.

## 2020-10-08 NOTE — TELEPHONE ENCOUNTER
Spoke to patient to review normal stress echo per Dr Ruth. Will await results of Ziopatch for further recommendations. Pt verbalized understanding, agreeable to plan.

## 2020-10-13 ENCOUNTER — PRE VISIT (OUTPATIENT)
Dept: OTOLARYNGOLOGY | Facility: CLINIC | Age: 35
End: 2020-10-13

## 2020-10-13 ENCOUNTER — OFFICE VISIT (OUTPATIENT)
Dept: OTOLARYNGOLOGY | Facility: CLINIC | Age: 35
End: 2020-10-13
Attending: NURSE PRACTITIONER
Payer: COMMERCIAL

## 2020-10-13 VITALS
OXYGEN SATURATION: 99 % | BODY MASS INDEX: 24.29 KG/M2 | HEART RATE: 72 BPM | HEIGHT: 69 IN | TEMPERATURE: 97.9 F | WEIGHT: 164 LBS

## 2020-10-13 DIAGNOSIS — J01.01 ACUTE RECURRENT MAXILLARY SINUSITIS: Primary | ICD-10-CM

## 2020-10-13 PROCEDURE — 31575 DIAGNOSTIC LARYNGOSCOPY: CPT | Performed by: OTOLARYNGOLOGY

## 2020-10-13 PROCEDURE — 99214 OFFICE O/P EST MOD 30 MIN: CPT | Mod: 25 | Performed by: OTOLARYNGOLOGY

## 2020-10-13 RX ORDER — LORATADINE 10 MG/1
10 TABLET ORAL DAILY
Qty: 30 TABLET | Refills: 3 | Status: SHIPPED | OUTPATIENT
Start: 2020-10-13 | End: 2020-11-12

## 2020-10-13 ASSESSMENT — MIFFLIN-ST. JEOR: SCORE: 1508.28

## 2020-10-13 ASSESSMENT — PAIN SCALES - GENERAL: PAINLEVEL: NO PAIN (0)

## 2020-10-13 NOTE — PROGRESS NOTES
HISTORY OF PRESENT ILLNESS: Sarah Schlatter is a 35 year old female with a history of  longstanding complaints of nasal sinus issues.  She has had some allergic symptoms that she needs to take medicine for.  She has globus pharyngeus, but she denies any reflux symptoms.  She otherwise has no other current complaints at the present time today outside of this.  This has been going on for several months A 12-point review of systems on her is otherwise essentially negative.  She is eating, drinking, breathing and swallowing fine.  She has a globus sensation.        Last 2 Scores for Patient-Answered VHI Questionnaire  No flowsheet data found.    Last 2 Scores for Patient-Answered CSI Questionnaire  No flowsheet data found.      Last 2 Scores for Patient-Answered EAT Questionnaire  No flowsheet data found.        PAST MEDICAL HISTORY:   Past Medical History:   Diagnosis Date     Anxiety      Depressive disorder      Dysphagia      Palpitations        PAST SURGICAL HISTORY:   Past Surgical History:   Procedure Laterality Date     C/SECTION, LOW TRANSVERSE Bilateral 2017    , Low Transverse       FAMILY HISTORY:   Family History   Problem Relation Age of Onset     Cancer Father      Depression Sister        SOCIAL HISTORY:   Social History     Tobacco Use     Smoking status: Never Smoker     Smokeless tobacco: Never Used   Substance Use Topics     Alcohol use: No     Alcohol/week: 0.0 standard drinks     Comment: occassional       REVIEW OF SYSTEMS: Ten point review of systems was performed and is negative except for:   [facility] ROS 2016   Neurology Headache   Ears, Nose, Throat Ear pain   Endocrine Heat or cold intolerance        ALLERGIES: Patient has no known allergies.    MEDICATIONS:   Current Outpatient Medications   Medication Sig Dispense Refill     ALPRAZolam (XANAX) 0.25 MG tablet        Levonorgestrel-Ethinyl Estrad (LUTERA PO)        loratadine (CLARITIN) 10 MG tablet Take 1 tablet (10 mg) by  mouth daily 30 tablet 3     sertraline (ZOLOFT) 25 MG tablet Take 1 tablet (25 mg) by mouth daily Take 25 mg by mouth daily for 7 days, then increase to 50 mg by mouth daily if tolerated well. (Patient taking differently: Take 150 mg by mouth daily Take 25 mg by mouth daily for 7 days, then increase to 50 mg by mouth daily if tolerated well.) 30 tablet 1         PHYSICAL EXAMINATION:  She  is awake, alert and in no apparent distress.    Her tympanic membranes are clear and intact bilaterally. External auditory canals are clear.  Nasal exam shows a mild septal deviation without obstruction.  Examination of the oral cavity shows no suspicious lesions.  There is symmetric movement of the tongue and soft palate.    The oropharynx is clear.  Her neck is supple without significant adenopathy.  Pulse is regular.  Upper airway is clear.  Cranial nerves II-XII are grossly intact.       PROCEDURE: A flexible laryngoscopy  was performed transorally.   Examination showed the vocal folds to be mobile and meet in the midline.  No nodules, polyps or ulcerations are seen.  .  The hypopharynx is otherwise clear as is the subglottis.     IMPRESSION/PLAN:        ASSESSMENT AND PLAN:  Patient with history of globus.  I scoped her today.  Everything looks wonderful.  I talked to her about doing a trial of Flonase or Claritin or nothing based on today's exam.  We will see what happens after the first major frost.  She has just recently moved to an area where there is a lot of construction and some of this may be an overlay on the rest of her symptoms as well.         Cortez Ochoa MD

## 2020-10-13 NOTE — NURSING NOTE
"Chief Complaint   Patient presents with     Consult     New patient       Pulse 72, temperature 97.9  F (36.6  C), height 1.753 m (5' 9\"), weight 74.4 kg (164 lb), SpO2 99 %, unknown if currently breastfeeding.    Cheryl Cross, EMT    "

## 2020-10-13 NOTE — LETTER
10/13/2020       RE: Sarah Schlatter  [address] [place] [address]     Dear Colleague,    Thank you for referring your patient, Sarah Schlatter, to the [facility] [place] [facility]. Please see a copy of my visit note below.    HISTORY OF PRESENT ILLNESS: Sarah Schlatter is a 35 year old female with a history of  longstanding complaints of nasal sinus issues.  She has had some allergic symptoms that she needs to take medicine for.  She has globus pharyngeus, but she denies any reflux symptoms.  She otherwise has no other current complaints at the present time today outside of this.  This has been going on for several months A 12-point review of systems on her is otherwise essentially negative.  She is eating, drinking, breathing and swallowing fine.  She has a globus sensation.        Last 2 Scores for Patient-Answered VHI Questionnaire  No flowsheet data found.    Last 2 Scores for Patient-Answered CSI Questionnaire  No flowsheet data found.      Last 2 Scores for Patient-Answered EAT Questionnaire  No flowsheet data found.        PAST MEDICAL HISTORY:   Past Medical History:   Diagnosis Date     Anxiety      Depressive disorder      Dysphagia      Palpitations        PAST SURGICAL HISTORY:   Past Surgical History:   Procedure Laterality Date     C/SECTION, LOW TRANSVERSE Bilateral 2017    , Low Transverse       FAMILY HISTORY:   Family History   Problem Relation Age of Onset     Cancer Father      Depression Sister        SOCIAL HISTORY:   Social History     Tobacco Use     Smoking status: Never Smoker     Smokeless tobacco: Never Used   Substance Use Topics     Alcohol use: No     Alcohol/week: 0.0 standard drinks     Comment: occassional       REVIEW OF SYSTEMS: Ten point review of systems was performed and is negative except for:   [facility] 2016   Neurology Headache   Ears, Nose, Throat Ear pain    Endocrine Heat or cold intolerance        ALLERGIES: Patient has no known allergies.    MEDICATIONS:   Current Outpatient Medications   Medication Sig Dispense Refill     ALPRAZolam (XANAX) 0.25 MG tablet        Levonorgestrel-Ethinyl Estrad (LUTERA PO)        loratadine (CLARITIN) 10 MG tablet Take 1 tablet (10 mg) by mouth daily 30 tablet 3     sertraline (ZOLOFT) 25 MG tablet Take 1 tablet (25 mg) by mouth daily Take 25 mg by mouth daily for 7 days, then increase to 50 mg by mouth daily if tolerated well. (Patient taking differently: Take 150 mg by mouth daily Take 25 mg by mouth daily for 7 days, then increase to 50 mg by mouth daily if tolerated well.) 30 tablet 1         PHYSICAL EXAMINATION:  She  is awake, alert and in no apparent distress.    Her tympanic membranes are clear and intact bilaterally. External auditory canals are clear.  Nasal exam shows a mild septal deviation without obstruction.  Examination of the oral cavity shows no suspicious lesions.  There is symmetric movement of the tongue and soft palate.    The oropharynx is clear.  Her neck is supple without significant adenopathy.  Pulse is regular.  Upper airway is clear.  Cranial nerves II-XII are grossly intact.       PROCEDURE: A flexible laryngoscopy  was performed transorally.   Examination showed the vocal folds to be mobile and meet in the midline.  No nodules, polyps or ulcerations are seen.  .  The hypopharynx is otherwise clear as is the subglottis.     IMPRESSION/PLAN:        ASSESSMENT AND PLAN:  Patient with history of globus.  I scoped her today.  Everything looks wonderful.  I talked to her about doing a trial of Flonase or Claritin or nothing based on today's exam.  We will see what happens after the first major frost.  She has just recently moved to an area where there is a lot of construction and some of this may be an overlay on the rest of her symptoms as well.         Cortez Ochoa MD

## 2020-10-13 NOTE — PATIENT INSTRUCTIONS
1. You were seen in the ENT Clinic today by Dr. Ochoa.  If you have any questions or concerns after your appointment, please call   -  ENT Clinic: [phone]      2.   Plan to return to clinic return to clinic in 2 months.     Mary Scott LPN  [facility]- Otolaryngology  [phone]    Or    Mavis Yi RN  [facility] Ot[facility]   [phone]

## 2020-10-20 ENCOUNTER — MYC MEDICAL ADVICE (OUTPATIENT)
Dept: CARDIOLOGY | Facility: CLINIC | Age: 35
End: 2020-10-20

## 2020-10-21 ENCOUNTER — MYC MEDICAL ADVICE (OUTPATIENT)
Dept: CARDIOLOGY | Facility: CLINIC | Age: 35
End: 2020-10-21

## 2020-10-21 NOTE — TELEPHONE ENCOUNTER
My  Chart reply 10-  I forgot to mention that the palpitations on both Saturday and Sunday felt more  intense  (not sure if that s the right word) than before all 7-8 episodes caused me to cough/clear my throat.     I would like to wait for the Zio patch results. I m nervous about taking a beta blocker because I m already having sensations of lightheaded/dizziness (almost daily).    Will update Dr. Ruth.

## 2020-10-21 NOTE — TELEPHONE ENCOUNTER
My Chart message replied  Ms. Schlatter,     Dr. Ruth reviewed your message,  Dr. Ruth's reply is -. We can try low dose beta blocker therapy if you want  or we can wait for the results.       Please send a message to us if you would like to try the medication or wait until your results return.   Results are still be processed and may take a few more days to obtain them.      Thank you.

## 2020-10-21 NOTE — TELEPHONE ENCOUNTER
OK. We can try low dose beta blocker therapy if she wants or we can wait for the results. Thanks.

## 2020-10-21 NOTE — TELEPHONE ENCOUNTER
My Chart message 10-  Dr Ruth,      I asked my parents about our family heart health history. I found out that my Aunt (my dad s sister) has A-Fib, my Uncle (my dad s brother) had a heart attack at age 56 (not fatal), and my Grandpa (my dad s dad) had an AAA.      I realize my Zio patch results are probably not back yet, but wanted to mention that I was off during that week (it just worked out to be during a random week off). I did have a few more episodes of palpitations that seemed to be lasting longer 30-60 seconds. Last Saturday we had a neighborhood outdoor fall party (which means, literally, chasing 3 kids around outside). I had 4 separate episodes (30-60 seconds long, stopped me in my tracks) during the 5.5 hours that we were there. I had 3 more episodes (20-30 seconds long, stopped me in my tracks) the next day, no special events, just a typical Sunday at home.      Just an FYI message, no question.     Best,  Sarah Schlatter    Will forward to Dr. Ruth.

## 2020-10-27 ENCOUNTER — TELEPHONE (OUTPATIENT)
Dept: CARDIOLOGY | Facility: CLINIC | Age: 35
End: 2020-10-27

## 2020-10-27 ENCOUNTER — MYC MEDICAL ADVICE (OUTPATIENT)
Dept: CARDIOLOGY | Facility: CLINIC | Age: 35
End: 2020-10-27

## 2020-10-27 DIAGNOSIS — R00.2 PALPITATIONS: Primary | ICD-10-CM

## 2020-10-27 NOTE — TELEPHONE ENCOUNTER
"Elvin Ruth MD [facility] Heart Team 2             The zio patch monitor is essentially normal, although she did have symptoms associated with sinus tachycardia. We can consider a trial of low dose beta blocker therapy if she's still symptomatic. Thanks.        Attempted to reach patient but no answer and voicemail full so unable to leave message. MyChart message sent.     \"Hi Arpita,     We tried to reach you on your phone with the results of your heart monitor, however your voicemail is full so we could not leave a message.     Dr Ruth reviewed the monitor results and said that it was essentially normal. There were no abnormal heart rhythms detected on that recording. Occasionally, when the monitor was activated for symptoms it was associated with a normal sinus rhythm but at a faster rate (called sinus tachycardia). This is not dangerous, but Dr Ruth is offering you to trial a low-dose beta blocker to help slow down your heart rate and hopefully alleviate your symptoms.     Please send us a MyChart reply or call us with your thoughts.     Thank you,   Team 2 Nurses  [phone]\"     "

## 2020-10-28 RX ORDER — METOPROLOL SUCCINATE 25 MG/1
12.5 TABLET, EXTENDED RELEASE ORAL AT BEDTIME
Qty: 45 TABLET | Refills: 3 | Status: SHIPPED | OUTPATIENT
Start: 2020-10-28 | End: 2022-04-06

## 2020-10-28 NOTE — TELEPHONE ENCOUNTER
"Johnt reply from patient routed to Dr Ruth-     \"Ok, happy there were no abnormal heart rhythms.     I would like to try the beta blocker. ([facility] in [place] [employer] please)      Also, over the weekend, I started having night sweats again, (not sure if this is related to the ST or if its random) I had them for a week or so in August of this year (which is why I made the appointment to see my PCP, labs then were all normal). Last night, 10/27, I had to change my pjs in the middle of the night due to excessive sweating. I am not running a fever and the sweating seems to be only at night. (just an fyi)     thanks,   Arpita leon, I cleared my voicemail and its ok to leave detailed voice messages in the future.\"       "

## 2020-10-28 NOTE — TELEPHONE ENCOUNTER
Elvin Ruth MD  You 14 minutes ago (2:19 PM)        Lets start with toprol Xl 12.5 MG daily at bedtime thanks.           Called patient to review recommendations as above. Pt verbalized understanding. Rx sent to [facility] in [facility] in [place]. Pt will continue to monitor symptoms and let us know if she does not tolerate the metoprolol or feels that it needs to be increased.     Recommended patient follow up with PCP again regarding recurrent night sweats.

## 2020-11-17 ENCOUNTER — OFFICE VISIT (OUTPATIENT)
Dept: CARDIOLOGY | Facility: CLINIC | Age: 35
End: 2020-11-17
Attending: INTERNAL MEDICINE
Payer: COMMERCIAL

## 2020-11-17 VITALS
WEIGHT: 170 LBS | DIASTOLIC BLOOD PRESSURE: 67 MMHG | BODY MASS INDEX: 25.18 KG/M2 | HEART RATE: 90 BPM | HEIGHT: 69 IN | SYSTOLIC BLOOD PRESSURE: 116 MMHG

## 2020-11-17 DIAGNOSIS — R00.2 PALPITATIONS: ICD-10-CM

## 2020-11-17 PROCEDURE — 99214 OFFICE O/P EST MOD 30 MIN: CPT | Performed by: PHYSICIAN ASSISTANT

## 2020-11-17 ASSESSMENT — MIFFLIN-ST. JEOR: SCORE: 1535.49

## 2020-11-17 NOTE — PROGRESS NOTES
Primary Cardiologist: Dr. Ruth    Reason for Visit: Follow up after starting metoprolol    History of Present Illness:   This is a pleasant 35-year-old female who recently saw Dr. Ruth with symptoms of lightheadedness and palpitations for the last 4 to 6 weeks.    Lightheadedness appears to be more due to orthostatic hypotension.  Given recurrent symptoms of palpitations she was set up for stress echocardiogram as well as Zio patch monitor.  Stress echocardiogram showed no evidence of ischemia or infarct.  She had no structural abnormalities.  She had normal LV function.  She was noted to have left bundle branch block formation at peak exercise which resolved at rest.  Zio patch monitor showed occasional isolated PACs and PVCs.  She had one episode of sinus tachycardia.  Overall no significant abnormalities were noted.  These results were reviewed by Dr. Ruth who recommended initiating low-dose metoprolol if patient continued to have symptoms.    Arpita reports feeling okay but she thinks she continues to have brief episodes of palpitations despite starting metoprolol.  She has switch her work schedule which has reduced some of her stress and thinks this was the main  of her symptoms.  She will usually have palpitations after mild to moderate activity and these will last in seconds.  She has had no true syncope.  She continues to have lightheadedness mainly with position changes such as standing up.  She does not think metoprolol helped but she also feels that her symptoms have not gotten worse.  She denies shortness of breath, peripheral edema, bleeding issues, or any other symptoms.    Of note, she had full lab work-up which was benign.    Assessment and Plan:  Arpita is a very pleasant 35-year-old female who presents to cardiology clinic for evaluation after starting metoprolol for palpitations which were noted to be due to sinus tachycardia as well as isolated PACs and PVCs.  Overall she feels metoprolol has  not helped.  We decided to have her discontinue this.  We talked about her test results which were all reassuring.  I did make sure that we were able to capture her episodes of palpitations during the time she wore the Zio patch monitor.  She did say that she had one significant episode while she was wearing the monitor.  I feel we were able to evaluate this adequately.  We will not make any other medication changes at this time.  I will have Arpita see us in cardiology clinic as needed.  We also talked about lifestyle changes such as less alcohol/caffeine intake, better quality of sleep, and increasing hydration.  I also discussed with Arpita that LBBB formation during peak exercise is on relevant to her palpitations and does not impose any significant risk but does show that she may have some variation in the conduction path of her heart.  She verbalized understanding.      This note was completed in part using Dragon voice recognition software. Although reviewed after completion, some word and grammatical errors may occur.    Orders this Visit:  No orders of the defined types were placed in this encounter.    Orders Placed This Encounter   Medications     COLLAGEN PO     Sig: Take 3,000 mg by mouth 2 times daily     Medications Discontinued During This Encounter   Medication Reason     sertraline (ZOLOFT) 25 MG tablet Therapy completed         Encounter Diagnosis   Name Primary?     Palpitations        CURRENT MEDICATIONS:  Current Outpatient Medications   Medication Sig Dispense Refill     ALPRAZolam (XANAX) 0.25 MG tablet as needed        COLLAGEN PO Take 3,000 mg by mouth 2 times daily       Levonorgestrel-Ethinyl Estrad (LUTERA PO)        metoprolol succinate ER (TOPROL XL) 25 MG 24 hr tablet Take 0.5 tablets (12.5 mg) by mouth At Bedtime 45 tablet 3       ALLERGIES   No Known Allergies    PAST MEDICAL HISTORY:  Past Medical History:   Diagnosis Date     Anxiety      Depressive disorder      Dysphagia       Palpitations        PAST SURGICAL HISTORY:  Past Surgical History:   Procedure Laterality Date     C/SECTION, LOW TRANSVERSE Bilateral 2017    , Low Transverse       FAMILY HISTORY:  Family History   Problem Relation Age of Onset     Cancer Father      Depression Sister        SOCIAL HISTORY:  Social History     Socioeconomic History     Marital status:      Spouse name: None     Number of children: None     Years of education: None     Highest education level: None   Occupational History     None   Social Needs     Financial resource strain: None     Food insecurity     Worry: None     Inability: None     Transportation needs     Medical: None     Non-medical: None   Tobacco Use     Smoking status: Never Smoker     Smokeless tobacco: Never Used   Substance and Sexual Activity     Alcohol use: No     Alcohol/week: 0.0 standard drinks     Comment: occassional     Drug use: No     Sexual activity: Yes     Partners: Male   Lifestyle     Physical activity     Days per week: None     Minutes per session: None     Stress: None   Relationships     Social connections     Talks on phone: None     Gets together: None     Attends Caodaism service: None     Active member of club or organization: None     Attends meetings of clubs or organizations: None     Relationship status: None     Intimate partner violence     Fear of current or ex partner: None     Emotionally abused: None     Physically abused: None     Forced sexual activity: None   Other Topics Concern     None   Social History Narrative     None       Review of Systems:  Skin:  Negative     Eyes:  Negative    ENT:  Negative    Respiratory:  Negative    Cardiovascular:    Positive for;palpitations  Gastroenterology: Negative    Genitourinary:  not assessed    Musculoskeletal:  Negative    Neurologic:  Positive for headaches  Psychiatric:  Negative    Heme/Lymph/Imm:  Positive for allergies  Endocrine:  Negative      Physical Exam:  Vitals: BP  "116/67   Pulse 90   Ht 1.753 m (5' 9\")   Wt 77.1 kg (170 lb)   BMI 25.10 kg/m       GEN:  NAD  NECK: No JVD  C/V:  Regular rate and rhythm, no murmur, rub or gallop.  RESP: Clear to auscultation bilaterally without wheezing, rales, or rhonchi.  GI: Abdomen soft, nontender, nondistended. No HSM appreciated.   EXTREM: No pitting LE edema.   NEURO: Alert and oriented, cooperative. No obvious focal deficits.   PSYCH: Normal affect.  SKIN: Warm and dry.       Recent Lab Results:  LIPID RESULTS:  Lab Results   Component Value Date    CHOL 173 08/21/2020    HDL 79 08/21/2020    LDL 80 08/21/2020    TRIG 69 08/21/2020       LIVER ENZYME RESULTS:  No results found for: AST, ALT    CBC RESULTS:  Lab Results   Component Value Date    WBC 6.6 08/21/2020    RBC 4.43 08/21/2020    HGB 13.7 08/21/2020    HCT 41.9 08/21/2020    MCV 95 08/21/2020    MCH 30.9 08/21/2020    MCHC 32.7 08/21/2020    RDW 11.5 08/21/2020     08/21/2020       BMP RESULTS:  Lab Results   Component Value Date    GLC 86 08/21/2020        A1C RESULTS:  No results found for: A1C    INR RESULTS:  No results found for: INR        Duarte Almaraz PA-C, J[place]ON   November 17, 2020     "

## 2020-11-17 NOTE — LETTER
11/17/2020    Rima Jo, CHERYL  [facility] [address]  [place] [facility] [address]    RE: Arpita RUSSELL Schlatter       Dear Colleague,    I had the pleasure of seeing Sarah M Schlatter in the [facility].    Primary Cardiologist: Dr. Ruth    Reason for Visit: Follow up after starting metoprolol    History of Present Illness:   This is a pleasant 35-year-old female who recently saw Dr. Ruth with symptoms of lightheadedness and palpitations for the last 4 to 6 weeks.    Lightheadedness appears to be more due to orthostatic hypotension.  Given recurrent symptoms of palpitations she was set up for stress echocardiogram as well as Zio patch monitor.  Stress echocardiogram showed no evidence of ischemia or infarct.  She had no structural abnormalities.  She had normal LV function.  She was noted to have left bundle branch block formation at peak exercise which resolved at rest.  Zio patch monitor showed occasional isolated PACs and PVCs.  She had one episode of sinus tachycardia.  Overall no significant abnormalities were noted.  These results were reviewed by Dr. Ruth who recommended initiating low-dose metoprolol if patient continued to have symptoms.    rApita reports feeling okay but she thinks she continues to have brief episodes of palpitations despite starting metoprolol.  She has switch her work schedule which has reduced some of her stress and thinks this was the main  of her symptoms.  She will usually have palpitations after mild to moderate activity and these will last in seconds.  She has had no true syncope.  She continues to have lightheadedness mainly with position changes such as standing up.  She does not think metoprolol helped but she also feels that her symptoms have not gotten worse.  She denies shortness of breath, peripheral edema, bleeding issues, or any other symptoms.    Of note, she had full lab work-up which was benign.    Assessment and  Plan:  Arpita is a very pleasant 35-year-old female who presents to cardiology clinic for evaluation after starting metoprolol for palpitations which were noted to be due to sinus tachycardia as well as isolated PACs and PVCs.  Overall she feels metoprolol has not helped.  We decided to have her discontinue this.  We talked about her test results which were all reassuring.  I did make sure that we were able to capture her episodes of palpitations during the time she wore the Zio patch monitor.  She did say that she had one significant episode while she was wearing the monitor.  I feel we were able to evaluate this adequately.  We will not make any other medication changes at this time.  I will have Arpita see us in cardiology clinic as needed.  We also talked about lifestyle changes such as less alcohol/caffeine intake, better quality of sleep, and increasing hydration.  I also discussed with Arpita that LBBB formation during peak exercise is on relevant to her palpitations and does not impose any significant risk but does show that she may have some variation in the conduction path of her heart.  She verbalized understanding.      This note was completed in part using Dragon voice recognition software. Although reviewed after completion, some word and grammatical errors may occur.    Orders this Visit:  No orders of the defined types were placed in this encounter.    Orders Placed This Encounter   Medications     COLLAGEN PO     Sig: Take 3,000 mg by mouth 2 times daily     Medications Discontinued During This Encounter   Medication Reason     sertraline (ZOLOFT) 25 MG tablet Therapy completed         Encounter Diagnosis   Name Primary?     Palpitations        CURRENT MEDICATIONS:  Current Outpatient Medications   Medication Sig Dispense Refill     ALPRAZolam (XANAX) 0.25 MG tablet as needed        COLLAGEN PO Take 3,000 mg by mouth 2 times daily       Levonorgestrel-Ethinyl Estrad (LUTERA PO)        metoprolol  succinate ER (TOPROL XL) 25 MG 24 hr tablet Take 0.5 tablets (12.5 mg) by mouth At Bedtime 45 tablet 3       ALLERGIES   No Known Allergies    PAST MEDICAL HISTORY:  Past Medical History:   Diagnosis Date     Anxiety      Depressive disorder      Dysphagia      Palpitations        PAST SURGICAL HISTORY:  Past Surgical History:   Procedure Laterality Date     C/SECTION, LOW TRANSVERSE Bilateral 2017    , Low Transverse       FAMILY HISTORY:  Family History   Problem Relation Age of Onset     Cancer Father      Depression Sister        SOCIAL HISTORY:  Social History     Socioeconomic History     Marital status:      Spouse name: None     Number of children: None     Years of education: None     Highest education level: None   Occupational History     None   Social Needs     Financial resource strain: None     Food insecurity     Worry: None     Inability: None     Transportation needs     Medical: None     Non-medical: None   Tobacco Use     Smoking status: Never Smoker     Smokeless tobacco: Never Used   Substance and Sexual Activity     Alcohol use: No     Alcohol/week: 0.0 standard drinks     Comment: occassional     Drug use: No     Sexual activity: Yes     Partners: Male   Lifestyle     Physical activity     Days per week: None     Minutes per session: None     Stress: None   Relationships     Social connections     Talks on phone: None     Gets together: None     Attends Tenriism service: None     Active member of club or organization: None     Attends meetings of clubs or organizations: None     Relationship status: None     Intimate partner violence     Fear of current or ex partner: None     Emotionally abused: None     Physically abused: None     Forced sexual activity: None   Other Topics Concern     None   Social History Narrative     None       Review of Systems:  Skin:  Negative     Eyes:  Negative    ENT:  Negative    Respiratory:  Negative    Cardiovascular:    Positive  "for;palpitations  Gastroenterology: Negative    Genitourinary:  not assessed    Musculoskeletal:  Negative    Neurologic:  Positive for headaches  Psychiatric:  Negative    Heme/Lymph/Imm:  Positive for allergies  Endocrine:  Negative      Physical Exam:  Vitals: /67   Pulse 90   Ht 1.753 m (5' 9\")   Wt 77.1 kg (170 lb)   BMI 25.10 kg/m       GEN:  NAD  NECK: No JVD  C/V:  Regular rate and rhythm, no murmur, rub or gallop.  RESP: Clear to auscultation bilaterally without wheezing, rales, or rhonchi.  GI: Abdomen soft, nontender, nondistended. No HSM appreciated.   EXTREM: No pitting LE edema.   NEURO: Alert and oriented, cooperative. No obvious focal deficits.   PSYCH: Normal affect.  SKIN: Warm and dry.       Recent Lab Results:  LIPID RESULTS:  Lab Results   Component Value Date    CHOL 173 08/21/2020    HDL 79 08/21/2020    LDL 80 08/21/2020    TRIG 69 08/21/2020       LIVER ENZYME RESULTS:  No results found for: AST, ALT    CBC RESULTS:  Lab Results   Component Value Date    WBC 6.6 08/21/2020    RBC 4.43 08/21/2020    HGB 13.7 08/21/2020    HCT 41.9 08/21/2020    MCV 95 08/21/2020    MCH 30.9 08/21/2020    MCHC 32.7 08/21/2020    RDW 11.5 08/21/2020     08/21/2020       BMP RESULTS:  Lab Results   Component Value Date    GLC 86 08/21/2020        A1C RESULTS:  No results found for: A1C    INR RESULTS:  No results found for: INR        Thank you for allowing me to participate in the care of your patient.    Sincerely,     Duarte Almaraz PA-C     [facility]    "

## 2020-12-27 ENCOUNTER — HEALTH MAINTENANCE LETTER (OUTPATIENT)
Age: 35
End: 2020-12-27

## 2021-01-06 DIAGNOSIS — Z20.822 EXPOSURE TO COVID-19 VIRUS: ICD-10-CM

## 2021-01-06 PROCEDURE — 86769 SARS-COV-2 COVID-19 ANTIBODY: CPT | Performed by: NURSE PRACTITIONER

## 2021-01-06 PROCEDURE — 36415 COLL VENOUS BLD VENIPUNCTURE: CPT | Performed by: NURSE PRACTITIONER

## 2021-01-07 LAB
SARS-COV-2 AB PNL SERPL IA: NEGATIVE
SARS-COV-2 IGG SERPL IA-ACNC: NORMAL

## 2021-04-24 ENCOUNTER — HEALTH MAINTENANCE LETTER (OUTPATIENT)
Age: 36
End: 2021-04-24

## 2021-10-09 ENCOUNTER — HEALTH MAINTENANCE LETTER (OUTPATIENT)
Age: 36
End: 2021-10-09

## 2021-12-28 ENCOUNTER — MEDICAL CORRESPONDENCE (OUTPATIENT)
Dept: HEALTH INFORMATION MANAGEMENT | Facility: CLINIC | Age: 36
End: 2021-12-28
Payer: COMMERCIAL

## 2022-02-28 ENCOUNTER — TELEPHONE (OUTPATIENT)
Dept: CARDIOLOGY | Facility: CLINIC | Age: 37
End: 2022-02-28

## 2022-02-28 NOTE — TELEPHONE ENCOUNTER
[facility] Call Center    Phone Message    May a detailed message be left on voicemail: yes     Reason for Call: Appointment Intake    Referring Provider Name:   Diagnosis and/or Symptoms: stress test     Patient called and spoke with writer,she needs to schedule stress test. Please call patient to schedule appt.    Action Taken: Message routed to:  [facility] ([facility]): Cardio     Travel Screening: Not Applicable

## 2022-03-07 ENCOUNTER — HOSPITAL ENCOUNTER (OUTPATIENT)
Dept: CARDIOLOGY | Facility: CLINIC | Age: 37
Discharge: HOME | End: 2022-03-07
Attending: INTERNAL MEDICINE | Admitting: INTERNAL MEDICINE
Payer: COMMERCIAL

## 2022-03-07 DIAGNOSIS — R00.2 PALPITATIONS: ICD-10-CM

## 2022-03-07 DIAGNOSIS — R00.0 TACHYCARDIA: ICD-10-CM

## 2022-03-07 PROCEDURE — 93242 EXT ECG>48HR<7D RECORDING: CPT

## 2022-03-07 PROCEDURE — 93244 EXT ECG>48HR<7D REV&INTERPJ: CPT | Performed by: INTERNAL MEDICINE

## 2022-03-14 ENCOUNTER — TELEPHONE (OUTPATIENT)
Dept: CARDIOLOGY | Facility: CLINIC | Age: 37
End: 2022-03-14
Payer: COMMERCIAL

## 2022-03-14 NOTE — TELEPHONE ENCOUNTER
Spoke with Patient Zio Monitor was placed in the mail today. Probably will not be available for JUDI visit 3-16-22.   Patient currently awaiting for her stress test to be done today.     Patient states she will reschedule upcoming JUDI visit on 3-16-22 to about 2 weeks out to ensure that all results will be available.     Patient agrees with plan.

## 2022-03-15 ENCOUNTER — HOSPITAL ENCOUNTER (OUTPATIENT)
Dept: CARDIOLOGY | Facility: CLINIC | Age: 37
Setting detail: NUCLEAR MEDICINE
Discharge: HOME | End: 2022-03-15
Attending: INTERNAL MEDICINE
Payer: COMMERCIAL

## 2022-03-15 ENCOUNTER — HOSPITAL ENCOUNTER (OUTPATIENT)
Dept: CARDIOLOGY | Facility: CLINIC | Age: 37
Discharge: HOME | End: 2022-03-15
Attending: INTERNAL MEDICINE | Admitting: INTERNAL MEDICINE
Payer: COMMERCIAL

## 2022-03-15 ENCOUNTER — HOSPITAL ENCOUNTER (OUTPATIENT)
Dept: CARDIOLOGY | Facility: CLINIC | Age: 37
Discharge: HOME | End: 2022-03-15
Attending: INTERNAL MEDICINE
Payer: COMMERCIAL

## 2022-03-15 ENCOUNTER — TELEPHONE (OUTPATIENT)
Dept: CARDIOLOGY | Facility: CLINIC | Age: 37
End: 2022-03-15

## 2022-03-15 VITALS
BODY MASS INDEX: 24.91 KG/M2 | SYSTOLIC BLOOD PRESSURE: 102 MMHG | HEART RATE: 113 BPM | WEIGHT: 168.2 LBS | HEIGHT: 69 IN | OXYGEN SATURATION: 99 % | DIASTOLIC BLOOD PRESSURE: 66 MMHG

## 2022-03-15 DIAGNOSIS — I44.7 LBBB (LEFT BUNDLE BRANCH BLOCK): ICD-10-CM

## 2022-03-15 DIAGNOSIS — R94.39 ABNORMAL CARDIOVASCULAR STRESS TEST: ICD-10-CM

## 2022-03-15 DIAGNOSIS — R07.9 CHEST PAIN: ICD-10-CM

## 2022-03-15 DIAGNOSIS — I44.7 LBBB (LEFT BUNDLE BRANCH BLOCK): Primary | ICD-10-CM

## 2022-03-15 LAB
CV BLOOD PRESSURE: 53 MMHG
CV STRESS MAX HR HE: 131
RATE PRESSURE PRODUCT: NORMAL
STRESS ECHO BASELINE DIASTOLIC HE: 66
STRESS ECHO BASELINE HR: 87 BPM
STRESS ECHO BASELINE SYSTOLIC BP: 102
STRESS ECHO CALCULATED PERCENT HR: 71 %
STRESS ECHO LAST STRESS DIASTOLIC BP: 62
STRESS ECHO LAST STRESS SYSTOLIC BP: 110
STRESS ECHO TARGET HR: 185

## 2022-03-15 PROCEDURE — 93018 CV STRESS TEST I&R ONLY: CPT | Performed by: INTERNAL MEDICINE

## 2022-03-15 PROCEDURE — 343N000001 HC RX 343: Performed by: INTERNAL MEDICINE

## 2022-03-15 PROCEDURE — 93017 CV STRESS TEST TRACING ONLY: CPT

## 2022-03-15 PROCEDURE — 250N000011 HC RX IP 250 OP 636: Performed by: INTERNAL MEDICINE

## 2022-03-15 PROCEDURE — A9502 TC99M TETROFOSMIN: HCPCS | Performed by: INTERNAL MEDICINE

## 2022-03-15 PROCEDURE — 93016 CV STRESS TEST SUPVJ ONLY: CPT | Performed by: INTERNAL MEDICINE

## 2022-03-15 PROCEDURE — 78452 HT MUSCLE IMAGE SPECT MULT: CPT | Mod: 26 | Performed by: INTERNAL MEDICINE

## 2022-03-15 RX ORDER — ACYCLOVIR 200 MG/1
0-1 CAPSULE ORAL
Status: DISCONTINUED | OUTPATIENT
Start: 2022-03-15 | End: 2022-03-16 | Stop reason: HOSPADM

## 2022-03-15 RX ORDER — REGADENOSON 0.08 MG/ML
0.4 INJECTION, SOLUTION INTRAVENOUS ONCE
Status: COMPLETED | OUTPATIENT
Start: 2022-03-15 | End: 2022-03-15

## 2022-03-15 RX ORDER — AMINOPHYLLINE 25 MG/ML
50-100 INJECTION, SOLUTION INTRAVENOUS
Status: DISCONTINUED | OUTPATIENT
Start: 2022-03-15 | End: 2022-03-16 | Stop reason: HOSPADM

## 2022-03-15 RX ORDER — CAFFEINE CITRATE 20 MG/ML
60 SOLUTION INTRAVENOUS
Status: DISCONTINUED | OUTPATIENT
Start: 2022-03-15 | End: 2022-03-16 | Stop reason: HOSPADM

## 2022-03-15 RX ORDER — ALBUTEROL SULFATE 90 UG/1
2 AEROSOL, METERED RESPIRATORY (INHALATION) EVERY 5 MIN PRN
Status: DISCONTINUED | OUTPATIENT
Start: 2022-03-15 | End: 2022-03-16 | Stop reason: HOSPADM

## 2022-03-15 RX ADMIN — TETROFOSMIN 3.07 MCI.: 1.38 INJECTION, POWDER, LYOPHILIZED, FOR SOLUTION INTRAVENOUS at 08:54

## 2022-03-15 RX ADMIN — REGADENOSON 0.4 MG: 0.08 INJECTION, SOLUTION INTRAVENOUS at 10:22

## 2022-03-15 RX ADMIN — TETROFOSMIN 9.42 MCI.: 1.38 INJECTION, POWDER, LYOPHILIZED, FOR SOLUTION INTRAVENOUS at 10:25

## 2022-03-15 NOTE — TELEPHONE ENCOUNTER
Lexiscan completed today as below, ordered by Dr Ruth for episode of racing heart and chest tightness in February. Routed to Dr Ruth for review. JUDI OV 4/6/22.        The nuclear stress test is abnormal.     There is a small area of ischemia in the apical segment(s) of the left ventricle. There is a large mostly fixed area of perfusion defect involving septal/inferoseptal/anteroseptal walls.  The apical defect appears reversible. HR increased from baseline 87 to 148bpm with lexiscan. Septal defect may be due to HR increase and presence of LBBB.     Left ventricular function is low normal.     The left ventricular ejection fraction at rest is 53%.     There is no prior study for comparison.

## 2022-03-15 NOTE — TELEPHONE ENCOUNTER
Let's get a coronary CTA. I think the perfusion defect is from her LBBB but this will confirm that. Thanks.

## 2022-03-16 NOTE — TELEPHONE ENCOUNTER
Spoke with patient to review abnormal nuclear study and Dr. Ruth's recommendation to have CTA angiogram study. Patient verbalized understanding and agreed with plan.    Order placed.  Message sent to scheduling team to have imaging done prior to OV 4/6/2022.

## 2022-03-22 ENCOUNTER — MEDICAL CORRESPONDENCE (OUTPATIENT)
Dept: HEALTH INFORMATION MANAGEMENT | Facility: CLINIC | Age: 37
End: 2022-03-22
Payer: COMMERCIAL

## 2022-03-23 ENCOUNTER — HOSPITAL ENCOUNTER (OUTPATIENT)
Dept: CARDIOLOGY | Facility: CLINIC | Age: 37
Discharge: HOME | End: 2022-03-23
Attending: INTERNAL MEDICINE
Payer: COMMERCIAL

## 2022-03-23 VITALS — DIASTOLIC BLOOD PRESSURE: 59 MMHG | SYSTOLIC BLOOD PRESSURE: 99 MMHG | HEART RATE: 70 BPM

## 2022-03-23 DIAGNOSIS — R94.39 ABNORMAL CARDIOVASCULAR STRESS TEST: ICD-10-CM

## 2022-03-23 PROCEDURE — 75574 CT ANGIO HRT W/3D IMAGE: CPT | Mod: 26 | Performed by: INTERNAL MEDICINE

## 2022-03-23 PROCEDURE — 75574 CT ANGIO HRT W/3D IMAGE: CPT

## 2022-03-23 PROCEDURE — 250N000013 HC RX MED GY IP 250 OP 250 PS 637: Performed by: INTERNAL MEDICINE

## 2022-03-23 PROCEDURE — 250N000011 HC RX IP 250 OP 636: Performed by: INTERNAL MEDICINE

## 2022-03-23 RX ORDER — DILTIAZEM HYDROCHLORIDE 5 MG/ML
10-15 INJECTION INTRAVENOUS
Status: DISCONTINUED | OUTPATIENT
Start: 2022-03-23 | End: 2022-03-24 | Stop reason: HOSPADM

## 2022-03-23 RX ORDER — IOPAMIDOL 755 MG/ML
500 INJECTION, SOLUTION INTRAVASCULAR ONCE
Status: COMPLETED | OUTPATIENT
Start: 2022-03-23 | End: 2022-03-23

## 2022-03-23 RX ORDER — NITROGLYCERIN 0.4 MG/1
0.4 TABLET SUBLINGUAL
Status: DISCONTINUED | OUTPATIENT
Start: 2022-03-23 | End: 2022-03-24 | Stop reason: HOSPADM

## 2022-03-23 RX ORDER — METOPROLOL TARTRATE 25 MG/1
25-100 TABLET, FILM COATED ORAL
Status: COMPLETED | OUTPATIENT
Start: 2022-03-23 | End: 2022-03-23

## 2022-03-23 RX ORDER — IVABRADINE 5 MG/1
5-15 TABLET, FILM COATED ORAL
Status: COMPLETED | OUTPATIENT
Start: 2022-03-23 | End: 2022-03-23

## 2022-03-23 RX ORDER — ONDANSETRON 2 MG/ML
4 INJECTION INTRAMUSCULAR; INTRAVENOUS
Status: DISCONTINUED | OUTPATIENT
Start: 2022-03-23 | End: 2022-03-24 | Stop reason: HOSPADM

## 2022-03-23 RX ORDER — DIPHENHYDRAMINE HYDROCHLORIDE 50 MG/ML
25-50 INJECTION INTRAMUSCULAR; INTRAVENOUS
Status: DISCONTINUED | OUTPATIENT
Start: 2022-03-23 | End: 2022-03-24 | Stop reason: HOSPADM

## 2022-03-23 RX ORDER — METOPROLOL TARTRATE 1 MG/ML
5-15 INJECTION, SOLUTION INTRAVENOUS
Status: DISCONTINUED | OUTPATIENT
Start: 2022-03-23 | End: 2022-03-24 | Stop reason: HOSPADM

## 2022-03-23 RX ORDER — ACYCLOVIR 200 MG/1
0-1 CAPSULE ORAL
Status: DISCONTINUED | OUTPATIENT
Start: 2022-03-23 | End: 2022-03-24 | Stop reason: HOSPADM

## 2022-03-23 RX ORDER — DIPHENHYDRAMINE HCL 25 MG
25 CAPSULE ORAL
Status: DISCONTINUED | OUTPATIENT
Start: 2022-03-23 | End: 2022-03-24 | Stop reason: HOSPADM

## 2022-03-23 RX ORDER — DILTIAZEM HCL 60 MG
120 TABLET ORAL
Status: DISCONTINUED | OUTPATIENT
Start: 2022-03-23 | End: 2022-03-24 | Stop reason: HOSPADM

## 2022-03-23 RX ORDER — METHYLPREDNISOLONE SODIUM SUCCINATE 125 MG/2ML
125 INJECTION, POWDER, LYOPHILIZED, FOR SOLUTION INTRAMUSCULAR; INTRAVENOUS
Status: DISCONTINUED | OUTPATIENT
Start: 2022-03-23 | End: 2022-03-24 | Stop reason: HOSPADM

## 2022-03-23 RX ADMIN — NITROGLYCERIN 0.4 MG: 0.4 TABLET SUBLINGUAL at 14:54

## 2022-03-23 RX ADMIN — IOPAMIDOL 110 ML: 755 INJECTION, SOLUTION INTRAVENOUS at 15:03

## 2022-03-23 RX ADMIN — METOPROLOL TARTRATE 100 MG: 50 TABLET, FILM COATED ORAL at 13:03

## 2022-03-23 RX ADMIN — IVABRADINE 15 MG: 5 TABLET, FILM COATED ORAL at 13:04

## 2022-04-06 ENCOUNTER — OFFICE VISIT (OUTPATIENT)
Dept: CARDIOLOGY | Facility: CLINIC | Age: 37
End: 2022-04-06
Payer: COMMERCIAL

## 2022-04-06 VITALS
SYSTOLIC BLOOD PRESSURE: 123 MMHG | DIASTOLIC BLOOD PRESSURE: 74 MMHG | HEIGHT: 69 IN | WEIGHT: 169 LBS | HEART RATE: 87 BPM | BODY MASS INDEX: 25.03 KG/M2

## 2022-04-06 DIAGNOSIS — R07.89 CHEST TIGHTNESS: ICD-10-CM

## 2022-04-06 DIAGNOSIS — R00.2 PALPITATIONS: ICD-10-CM

## 2022-04-06 DIAGNOSIS — R00.0 TACHYCARDIA: ICD-10-CM

## 2022-04-06 PROCEDURE — 99214 OFFICE O/P EST MOD 30 MIN: CPT | Performed by: PHYSICIAN ASSISTANT

## 2022-04-06 NOTE — LETTER
4/6/2022    Rima Jo, CHERYL  [address]  [place] [address]    RE: Arpita RUSSELL Dagobertolabaltazar       Dear Colleague,     I had the pleasure of seeing Sarah M Schlatter in the [facility].  Primary Cardiologist: Dr. Ruth    Reason for Visit: Review test results    History of Present Illness:   Arpita is a pleasant 36-year-old female with past medical history notable for rare PACs and PVCs.  She was seen by Dr. Ruth recently with symptoms of heart racing at rest.  She was set up for Zio patch monitor as well as Lexiscan stress study.  Her Zio patch showed baseline rhythm to be normal sinus.  She had no concerning tachyarrhythmias.  She did have occasional PACs and PVCs but the burden was quite low.  She did have her usual palpitation/heart racing symptoms during the time she wore her monitor.  Her nuclear stress test came back abnormal showing a fixed defect along the septal/inferoseptal/anteroseptal walls with apical appearing somewhat reversible.  It was felt that patient's intermittent left bundle branch block could be part of the reason for these defects but given her symptoms she was set up for CT coronary angiogram.  This showed that her total score was 0 and she had no evidence of coronary artery disease.    Assessment and Plan:  Arpita tells me that she continues to have intermittent episodes of palpitations but it reassures her that she does not have anything concerning cardiac wise.  I have told her that she has no evidence of CAD based on CT coronary angiogram.  The defects that were noted on her stress test were likely due to her left bundle branch block.  I told her we do not do anything differently in regards to her bundle branch block.  She was quite happy with the results of her test.  She will follow-up with us in cardiology clinic as needed going forward.      This note was completed in part using Dragon voice recognition software. Although reviewed after completion, some word  and grammatical errors may occur.    Orders this Visit:  No orders of the defined types were placed in this encounter.    No orders of the defined types were placed in this encounter.    Medications Discontinued During This Encounter   Medication Reason     ALPRAZolam (XANAX) 0.25 MG tablet Stopped by Patient     Levonorgestrel-Ethinyl Estrad (LUTERA PO) Therapy completed     metoprolol succinate ER (TOPROL XL) 25 MG 24 hr tablet Therapy completed         Encounter Diagnoses   Name Primary?     Palpitations      Tachycardia      Chest tightness        CURRENT MEDICATIONS:  Current Outpatient Medications   Medication Sig Dispense Refill     COLLAGEN PO Take 3,000 mg by mouth 2 times daily         ALLERGIES   No Known Allergies    PAST MEDICAL HISTORY:  Past Medical History:   Diagnosis Date     Anxiety      Depressive disorder      Dysphagia      Palpitations        PAST SURGICAL HISTORY:  Past Surgical History:   Procedure Laterality Date     C/SECTION, LOW TRANSVERSE Bilateral 2017    , Low Transverse       FAMILY HISTORY:  Family History   Problem Relation Age of Onset     Cancer Father      Depression Sister        SOCIAL HISTORY:  Social History     Socioeconomic History     Marital status:      Spouse name: None     Number of children: None     Years of education: None     Highest education level: None   Occupational History     None   Tobacco Use     Smoking status: Never Smoker     Smokeless tobacco: Never Used   Substance and Sexual Activity     Alcohol use: No     Alcohol/week: 0.0 standard drinks     Comment: occassional     Drug use: No     Sexual activity: Yes     Partners: Male   Other Topics Concern     None   Social History Narrative     None     Social Determinants of Health     Financial Resource Strain: Not on file   Food Insecurity: Not on file   Transportation Needs: Not on file   Physical Activity: Not on file   Stress: Not on file   Social Connections: Not on file  "  Intimate Partner Violence: Not on file   Housing Stability: Not on file       Review of Systems:  Skin:  Negative     Eyes:  Negative    ENT:  Negative    Respiratory:  Negative    Cardiovascular:    Positive for;palpitations  Gastroenterology: Negative    Genitourinary:  Negative    Musculoskeletal:  Negative    Neurologic:  Negative    Psychiatric:  Negative    Heme/Lymph/Imm:  Negative    Endocrine:  Negative      Physical Exam:  Vitals: /74 (BP Location: Right arm, Cuff Size: Adult Regular)   Pulse 87   Ht 1.753 m (5' 9\")   Wt 76.7 kg (169 lb)   BMI 24.96 kg/m       GEN:  NAD  NECK: No JVD  C/V:  Regular rate and rhythm, no murmur, rub or gallop.  RESP: Clear to auscultation bilaterally without wheezing, rales, or rhonchi.  GI: Abdomen soft, nontender, nondistended.    EXTREM: No pitting LE edema.   NEURO: Alert and oriented, cooperative. No obvious focal deficits.   PSYCH: Normal affect.  SKIN: Warm and dry.       Recent Lab Results:  LIPID RESULTS:  Lab Results   Component Value Date    CHOL 173 08/21/2020    HDL 79 08/21/2020    LDL 80 08/21/2020    TRIG 69 08/21/2020       LIVER ENZYME RESULTS:  No results found for: AST, ALT    CBC RESULTS:  Lab Results   Component Value Date    WBC 6.6 08/21/2020    RBC 4.43 08/21/2020    HGB 13.7 08/21/2020    HCT 41.9 08/21/2020    MCV 95 08/21/2020    MCH 30.9 08/21/2020    MCHC 32.7 08/21/2020    RDW 11.5 08/21/2020     08/21/2020       BMP RESULTS:  Lab Results   Component Value Date    GLC 86 08/21/2020        A1C RESULTS:  No results found for: A1C    INR RESULTS:  No results found for: INR        Duarte Almaraz PA-C  April 6, 2022     Thank you for allowing me to participate in the care of your patient.      Sincerely,     Duarte Almaraz PA-C     [facility]  cc:   Elvin Ruth MD  [id number]  [place] [id number]        "

## 2022-04-06 NOTE — PROGRESS NOTES
Primary Cardiologist: Dr. Ruth    Reason for Visit: Review test results    History of Present Illness:   Arpita is a pleasant 36-year-old female with past medical history notable for rare PACs and PVCs.  She was seen by Dr. Ruth recently with symptoms of heart racing at rest.  She was set up for Zio patch monitor as well as Lexiscan stress study.  Her Zio patch showed baseline rhythm to be normal sinus.  She had no concerning tachyarrhythmias.  She did have occasional PACs and PVCs but the burden was quite low.  She did have her usual palpitation/heart racing symptoms during the time she wore her monitor.  Her nuclear stress test came back abnormal showing a fixed defect along the septal/inferoseptal/anteroseptal walls with apical appearing somewhat reversible.  It was felt that patient's intermittent left bundle branch block could be part of the reason for these defects but given her symptoms she was set up for CT coronary angiogram.  This showed that her total score was 0 and she had no evidence of coronary artery disease.    Assessment and Plan:  Arpita tells me that she continues to have intermittent episodes of palpitations but it reassures her that she does not have anything concerning cardiac wise.  I have told her that she has no evidence of CAD based on CT coronary angiogram.  The defects that were noted on her stress test were likely due to her left bundle branch block.  I told her we do not do anything differently in regards to her bundle branch block.  She was quite happy with the results of her test.  She will follow-up with us in cardiology clinic as needed going forward.      This note was completed in part using Dragon voice recognition software. Although reviewed after completion, some word and grammatical errors may occur.    Orders this Visit:  No orders of the defined types were placed in this encounter.    No orders of the defined types were placed in this encounter.    Medications Discontinued  During This Encounter   Medication Reason     ALPRAZolam (XANAX) 0.25 MG tablet Stopped by Patient     Levonorgestrel-Ethinyl Estrad (LUTERA PO) Therapy completed     metoprolol succinate ER (TOPROL XL) 25 MG 24 hr tablet Therapy completed         Encounter Diagnoses   Name Primary?     Palpitations      Tachycardia      Chest tightness        CURRENT MEDICATIONS:  Current Outpatient Medications   Medication Sig Dispense Refill     COLLAGEN PO Take 3,000 mg by mouth 2 times daily         ALLERGIES   No Known Allergies    PAST MEDICAL HISTORY:  Past Medical History:   Diagnosis Date     Anxiety      Depressive disorder      Dysphagia      Palpitations        PAST SURGICAL HISTORY:  Past Surgical History:   Procedure Laterality Date     C/SECTION, LOW TRANSVERSE Bilateral 2017    , Low Transverse       FAMILY HISTORY:  Family History   Problem Relation Age of Onset     Cancer Father      Depression Sister        SOCIAL HISTORY:  Social History     Socioeconomic History     Marital status:      Spouse name: None     Number of children: None     Years of education: None     Highest education level: None   Occupational History     None   Tobacco Use     Smoking status: Never Smoker     Smokeless tobacco: Never Used   Substance and Sexual Activity     Alcohol use: No     Alcohol/week: 0.0 standard drinks     Comment: occassional     Drug use: No     Sexual activity: Yes     Partners: Male   Other Topics Concern     None   Social History Narrative     None     Social Determinants of Health     Financial Resource Strain: Not on file   Food Insecurity: Not on file   Transportation Needs: Not on file   Physical Activity: Not on file   Stress: Not on file   Social Connections: Not on file   Intimate Partner Violence: Not on file   Housing Stability: Not on file       Review of Systems:  Skin:  Negative     Eyes:  Negative    ENT:  Negative    Respiratory:  Negative    Cardiovascular:    Positive  "for;palpitations  Gastroenterology: Negative    Genitourinary:  Negative    Musculoskeletal:  Negative    Neurologic:  Negative    Psychiatric:  Negative    Heme/Lymph/Imm:  Negative    Endocrine:  Negative      Physical Exam:  Vitals: /74 (BP Location: Right arm, Cuff Size: Adult Regular)   Pulse 87   Ht 1.753 m (5' 9\")   Wt 76.7 kg (169 lb)   BMI 24.96 kg/m       GEN:  NAD  NECK: No JVD  C/V:  Regular rate and rhythm, no murmur, rub or gallop.  RESP: Clear to auscultation bilaterally without wheezing, rales, or rhonchi.  GI: Abdomen soft, nontender, nondistended.    EXTREM: No pitting LE edema.   NEURO: Alert and oriented, cooperative. No obvious focal deficits.   PSYCH: Normal affect.  SKIN: Warm and dry.       Recent Lab Results:  LIPID RESULTS:  Lab Results   Component Value Date    CHOL 173 08/21/2020    HDL 79 08/21/2020    LDL 80 08/21/2020    TRIG 69 08/21/2020       LIVER ENZYME RESULTS:  No results found for: AST, ALT    CBC RESULTS:  Lab Results   Component Value Date    WBC 6.6 08/21/2020    RBC 4.43 08/21/2020    HGB 13.7 08/21/2020    HCT 41.9 08/21/2020    MCV 95 08/21/2020    MCH 30.9 08/21/2020    MCHC 32.7 08/21/2020    RDW 11.5 08/21/2020     08/21/2020       BMP RESULTS:  Lab Results   Component Value Date    GLC 86 08/21/2020        A1C RESULTS:  No results found for: A1C    INR RESULTS:  No results found for: INR        Duarte Almaraz PA-C  April 6, 2022     "

## 2022-05-16 ENCOUNTER — HEALTH MAINTENANCE LETTER (OUTPATIENT)
Age: 37
End: 2022-05-16

## 2022-05-25 ENCOUNTER — MEDICAL CORRESPONDENCE (OUTPATIENT)
Dept: HEALTH INFORMATION MANAGEMENT | Facility: CLINIC | Age: 37
End: 2022-05-25
Payer: COMMERCIAL

## 2022-09-11 ENCOUNTER — HEALTH MAINTENANCE LETTER (OUTPATIENT)
Age: 37
End: 2022-09-11

## 2022-11-18 ENCOUNTER — TRANSFERRED RECORDS (OUTPATIENT)
Dept: MULTI SPECIALTY CLINIC | Facility: CLINIC | Age: 37
End: 2022-11-18

## 2022-11-18 LAB — PAP SMEAR - HIM PATIENT REPORTED: NORMAL

## 2023-06-03 ENCOUNTER — HEALTH MAINTENANCE LETTER (OUTPATIENT)
Age: 38
End: 2023-06-03

## 2023-10-04 ENCOUNTER — TRANSFERRED RECORDS (OUTPATIENT)
Dept: HEALTH INFORMATION MANAGEMENT | Facility: CLINIC | Age: 38
End: 2023-10-04

## 2023-10-04 LAB
ALT SERPL-CCNC: 16 U/L (ref 6–29)
AST SERPL-CCNC: 18 U/L (ref 10–30)
CREATININE (EXTERNAL): 0.72 MG/DL (ref 0.5–0.97)
GFR ESTIMATED (EXTERNAL): 110 ML/MIN/1.73M2
GLUCOSE (EXTERNAL): 80 MG/DL (ref 65–99)
POTASSIUM (EXTERNAL): 4.1 MMOL/L (ref 3.5–5.3)
TSH SERPL-ACNC: 2.3 MIU/L

## 2023-10-13 ENCOUNTER — TRANSFERRED RECORDS (OUTPATIENT)
Dept: HEALTH INFORMATION MANAGEMENT | Facility: CLINIC | Age: 38
End: 2023-10-13
Payer: COMMERCIAL

## 2023-10-25 ENCOUNTER — MYC MEDICAL ADVICE (OUTPATIENT)
Dept: FAMILY MEDICINE | Facility: CLINIC | Age: 38
End: 2023-10-25

## 2023-10-25 DIAGNOSIS — Z80.3 FAMILY HISTORY OF MALIGNANT NEOPLASM OF BREAST: Primary | ICD-10-CM

## 2023-10-26 ENCOUNTER — VIRTUAL VISIT (OUTPATIENT)
Dept: FAMILY MEDICINE | Facility: CLINIC | Age: 38
End: 2023-10-26
Payer: COMMERCIAL

## 2023-10-26 DIAGNOSIS — R92.8 ABNORMAL FINDING ON BREAST IMAGING: Primary | ICD-10-CM

## 2023-10-26 PROCEDURE — 99203 OFFICE O/P NEW LOW 30 MIN: CPT | Mod: VID | Performed by: PHYSICIAN ASSISTANT

## 2023-10-26 NOTE — PROGRESS NOTES
"Arpita is a 38 year old who is being evaluated via a billable video visit.      How would you like to obtain your AVS? MyChart  If the video visit is dropped, the invitation should be resent by: Text to cell phone: [phone]  Will anyone else be joining your video visit? No      Assessment & Plan     Abnormal finding on breast imaging  Pt is asymptomatic. She sought out \"baseline breast imaging\" prior to an anticipated breast augmentation surgery.   US report with 2 areas on left breast that need followup  Diagnostic mammogram ordered. Follow up pending further evaluation.   Pt's mother had DCIS in her 50s and was negative for BRCA or any other genetic mutation.  - MA Diagnostic Digital Bilateral; Future  }     Follow Up: see above. Additionally patient was instructed to contact clinic for worsening symptoms, non-improvement in time frame discussed, and for questions regarding treatment plan.   For virtual visits, the patient was advised to be seen for in person evaluation if symptoms or condition are worsening or non-improvement as expected.       Marilyn Guillen PA-C  [facility] [place] is a 38 year old, presenting for the following health issues:  Breast Exam  -Requesting a breast exam. PT was seen outside of [facility] at a private clinic for an Ultrasound Breast exam and the provider note 2 areas of concern for breast cancer. Family Hx of breast cancer      10/26/2023    11:12 AM   Additional Questions   Roomed by Marcelino BOYLE   Accompanied by Self         10/26/2023    11:12 AM   Patient Reported Additional Medications   Patient reports taking the following new medications None       History of Present Illness       Reason for visit:  I had a breast ultrasound (family hx of breast ca) that showed 2 hypoechoic spots on my left breast. (One at 3 oclock and one at 6 oclock that is 7mm). I would like a follow up MRI to reevaluate the spots.    She eats 2-3 servings of " "fruits and vegetables daily.She consumes 1 sweetened beverage(s) daily.She exercises with enough effort to increase her heart rate 30 to 60 minutes per day.  She exercises with enough effort to increase her heart rate 5 days per week.   She is taking medications regularly.     Abnormal breast imaging -    Looking into breast augmentation. Wanted some type of imaging done prior to that to have a \"baseline\". Anita wouldn't let her schedule a mammogram. So looked up options in google and gave info for \"pop up clinic\". Radiology report she has copy of says: LEFT breast: hypoechoic area at 3:00 left breast, and hypoechoic area at 6:00 left breast measuring 7mm.     She has no symptoms or palpable lumps. No skin changes. No nipple discharge.     Has tenderness at time of her period of breasts and underarms. She is not breastfeeding. Weaned last child 18mo ago.    Mother had DCIS and lumpectomy at age 58yo. Her mother had genetic testing and was negative for BRCA or any other mutation.   Mother also has multiple myeloma newly diagnosed this year  Father had advanced prostate cancer at age 59.     Sister had mammogram last year - told dense tissue- age 40.           Review of Systems   Constitutional, HEENT, cardiovascular, pulmonary, gi and gu systems are negative, except as otherwise noted.      Objective           Vitals:  No vitals were obtained today due to virtual visit.    Physical Exam   GENERAL: Healthy, alert and no distress  EYES: Eyes grossly normal to inspection.  No discharge or erythema, or obvious scleral/conjunctival abnormalities.  HENT: Normal cephalic/atraumatic.  External ears, nose and mouth without ulcers or lesions.  No nasal drainage visible.  RESP: No audible wheeze, cough, or visible cyanosis.  No visible retractions or increased work of breathing.    SKIN: Visible skin clear. No significant rash, abnormal pigmentation or lesions.  NEURO: Cranial nerves grossly intact.  Mentation and speech " appropriate for age.  PSYCH: Mentation appears normal, affect normal/bright, judgement and insight intact, normal speech and appearance well-groomed.            Video-Visit Details    Type of service:  Video Visit     Originating Location (pt. Location): Home    Distant Location (provider location):  Off-site  Platform used for Video Visit: Jose Alejandro

## 2023-10-31 ENCOUNTER — HOSPITAL ENCOUNTER (OUTPATIENT)
Dept: MAMMOGRAPHY | Facility: CLINIC | Age: 38
Discharge: HOME | End: 2023-10-31
Attending: PHYSICIAN ASSISTANT
Payer: COMMERCIAL

## 2023-10-31 DIAGNOSIS — R92.8 ABNORMAL FINDING ON BREAST IMAGING: ICD-10-CM

## 2023-10-31 PROCEDURE — 77062 BREAST TOMOSYNTHESIS BI: CPT

## 2023-10-31 PROCEDURE — 76642 ULTRASOUND BREAST LIMITED: CPT | Mod: LT

## 2023-11-22 ENCOUNTER — OFFICE VISIT (OUTPATIENT)
Dept: FAMILY MEDICINE | Facility: CLINIC | Age: 38
End: 2023-11-22
Payer: COMMERCIAL

## 2023-11-22 VITALS
WEIGHT: 134 LBS | HEIGHT: 70 IN | BODY MASS INDEX: 19.18 KG/M2 | DIASTOLIC BLOOD PRESSURE: 60 MMHG | SYSTOLIC BLOOD PRESSURE: 93 MMHG | HEART RATE: 78 BPM | TEMPERATURE: 97.7 F | OXYGEN SATURATION: 99 %

## 2023-11-22 DIAGNOSIS — Z41.1 ENCOUNTER FOR COSMETIC PROCEDURE: ICD-10-CM

## 2023-11-22 DIAGNOSIS — Z01.818 PREOP GENERAL PHYSICAL EXAM: Primary | ICD-10-CM

## 2023-11-22 PROCEDURE — 99214 OFFICE O/P EST MOD 30 MIN: CPT | Performed by: FAMILY MEDICINE

## 2023-11-22 RX ORDER — LIOTHYRONINE SODIUM 5 UG/1
5 TABLET ORAL DAILY
COMMUNITY
Start: 2023-11-22 | End: 2024-05-31

## 2023-11-22 ASSESSMENT — PAIN SCALES - GENERAL: PAINLEVEL: NO PAIN (0)

## 2023-11-22 NOTE — PROGRESS NOTES
[address] [address]  Phone: [phone]  Primary Provider: Rima Jo  Pre-op Performing Provider: SONNY THOMAS      PREOPERATIVE EVALUATION:  Today's date: 11/22/2023    Arpita is a 38 year old, presenting for the following:  Pre-Op Exam        11/22/2023    10:42 AM   Additional Questions   Roomed by Germaine       Surgical Information:  Surgery/Procedure: Breast Augmentation  Surgery Location: [facility]  Surgeon: Dr Flannery  Surgery Date: 11/29/2023  Time of Surgery: 3pm  Where patient plans to recover: At home with family  Fax number for surgical facility: [phone]    Assessment & Plan     The proposed surgical procedure is considered INTERMEDIATE risk.      ICD-10-CM    1. Preop general physical exam  Z01.818       2. Encounter for cosmetic procedure  Z41.1                       RECOMMENDATION:  APPROVAL GIVEN to proceed with proposed procedure, without further diagnostic evaluation.            Subjective       HPI related to upcoming procedure:         11/21/2023     1:47 PM   Preop Questions   1. Have you ever had a heart attack or stroke? No   2. Have you ever had surgery on your heart or blood vessels, such as a stent placement, a coronary artery bypass, or surgery on an artery in your head, neck, heart, or legs? No   3. Do you have chest pain with activity? No   4. Do you have a history of  heart failure? No   5. Do you currently have a cold, bronchitis or symptoms of other infection? No   6. Do you have a cough, shortness of breath, or wheezing? No   7. Do you or anyone in your family have previous history of blood clots? No   8. Do you or does anyone in your family have a serious bleeding problem such as prolonged bleeding following surgeries or cuts? No   9. Have you ever had problems with anemia or been told to take iron pills? No   10. Have you had any abnormal blood loss such as black, tarry or bloody stools, or abnormal  vaginal bleeding? No   11. Have you ever had a blood transfusion? No   12. Are you willing to have a blood transfusion if it is medically needed before, during, or after your surgery? Yes   13. Have you or any of your relatives ever had problems with anesthesia? No   14. Do you have sleep apnea, excessive snoring or daytime drowsiness? No   15. Do you have any artifical heart valves or other implanted medical devices like a pacemaker, defibrillator, or continuous glucose monitor? No   16. Do you have artificial joints? No   17. Are you allergic to latex? No   18. Is there any chance that you may be pregnant? No       Health Care Directive:  Patient does not have a Health Care Directive or Living Will:     Preoperative Review of :            Review of Systems  CONSTITUTIONAL: NEGATIVE for fever, chills, change in weight  INTEGUMENTARY/SKIN: NEGATIVE for worrisome rashes, moles or lesions  EYES: NEGATIVE for vision changes or irritation  ENT/MOUTH: NEGATIVE for ear, mouth and throat problems  RESP: NEGATIVE for significant cough or SOB  CV: NEGATIVE for chest pain, palpitations or peripheral edema  GI: NEGATIVE for nausea, abdominal pain, heartburn, or change in bowel habits  : NEGATIVE for frequency, dysuria, or hematuria  MUSCULOSKELETAL: NEGATIVE for significant arthralgias or myalgia  NEURO: NEGATIVE for weakness, dizziness or paresthesias  ENDOCRINE: NEGATIVE for temperature intolerance, skin/hair changes  HEME: NEGATIVE for bleeding problems  PSYCHIATRIC: NEGATIVE for changes in mood or affect    Patient Active Problem List    Diagnosis Date Noted    Palpitations      Priority: Medium    Decreased fetal movement 2018     Priority: Medium      Past Medical History:   Diagnosis Date    Anxiety     Depressive disorder     Dysphagia     Palpitations      Past Surgical History:   Procedure Laterality Date    C/SECTION, LOW TRANSVERSE Bilateral 2017    , Low Transverse    TUBAL LIGATION    "    Current Outpatient Medications   Medication Sig Dispense Refill    liothyronine (CYTOMEL) 5 MCG tablet Take 1 tablet (5 mcg) by mouth daily         Allergies   Allergen Reactions    Morphine Other (See Comments)     Crawling skin         Social History     Tobacco Use    Smoking status: Never    Smokeless tobacco: Never   Substance Use Topics    Alcohol use: Yes     Comment: occassional       History   Drug Use No         Objective     BP 93/60   Pulse 78   Temp 97.7  F (36.5  C) (Temporal)   Ht 1.765 m (5' 9.5\")   Wt 60.8 kg (134 lb)   LMP 10/31/2023 (Exact Date)   SpO2 99%   BMI 19.50 kg/m      Physical Exam    GENERAL APPEARANCE: healthy, alert and no distress     EYES: EOMI, PERRL     HENT: ear canals and TM's normal and nose and mouth without ulcers or lesions     NECK: no adenopathy, no asymmetry, masses, or scars and thyroid normal to palpation     RESP: lungs clear to auscultation - no rales, rhonchi or wheezes     CV: regular rates and rhythm, normal S1 S2, no S3 or S4 and no murmur, click or rub     ABDOMEN:  soft, nontender, no HSM or masses and bowel sounds normal     MS: extremities normal- no gross deformities noted, no evidence of inflammation in joints, FROM in all extremities.     SKIN: no suspicious lesions or rashes     NEURO: Normal strength and tone, sensory exam grossly normal, mentation intact and speech normal     PSYCH: mentation appears normal. and affect normal/bright     LYMPHATICS: No cervical adenopathy        Diagnostics:   Blood work done at my [facility] lab on 10/4/2023 reviewed.  Results are as below:    Creatinine 0.27  BUN 15    Sodium 135  Potassium 4.1  Glucose 80  TSH 2.3  WBC 4.7  Hemoglobin 12.3  Platelet 208      Revised Cardiac Risk Index (RCRI):  The patient has the following serious cardiovascular risks for perioperative complications:   - No serious cardiac risks = 0 points     RCRI Interpretation: 0 points: Class I (very low risk - 0.4% complication " rate)         Signed Electronically by: Jessica Raymond MD  Copy of this evaluation report is provided to requesting physician.

## 2023-11-27 ENCOUNTER — MYC MEDICAL ADVICE (OUTPATIENT)
Dept: FAMILY MEDICINE | Facility: CLINIC | Age: 38
End: 2023-11-27
Payer: COMMERCIAL

## 2023-11-28 ENCOUNTER — CARE COORDINATION (OUTPATIENT)
Dept: FAMILY MEDICINE | Facility: CLINIC | Age: 38
End: 2023-11-28

## 2023-11-28 ENCOUNTER — TELEPHONE (OUTPATIENT)
Dept: FAMILY MEDICINE | Facility: CLINIC | Age: 38
End: 2023-11-28
Payer: COMMERCIAL

## 2023-11-28 NOTE — TELEPHONE ENCOUNTER
Preop completed by Dr. Raymond. Preop faxed to [facility] as requested.   MyChart reply sent to the patient.  Renea Crespo RN

## 2023-11-28 NOTE — TELEPHONE ENCOUNTER
"Please see mychart.     Pt requesting to fax pre-op notes to surgery center. Surgery tomorrow 11/29/23.     Routing to provider. Please review and complete chart notes if needed \"pt approved to have surgery\".    Routing as high priority. Spoke to surgery center and confirmed they are waiting for pre op notes. Pre-op notes faxed, but it does not look like the preop notes have been completed. Please advise.     Pay-Me   Phone: [phone].     Fax number for surgical facility: [phone]     Please update pt when completed and faxed.     Can we leave a detailed message on this number? YES  Phone number patient can be reached at: Home number on file [phone] (home)    Savannah Gurrola RN  [facility] Triage    "

## 2023-11-28 NOTE — TELEPHONE ENCOUNTER
Faxed pre-op to fax number below    Surgery/Procedure: Breast Augmentation  Surgery Location: [facility]  Surgeon: Dr Flannery  Surgery Date: 11/29/2023  Time of Surgery: 3pm  Where patient plans to recover: At home with family  Fax number for surgical facility: [phone]

## 2023-12-17 ENCOUNTER — OFFICE VISIT (OUTPATIENT)
Dept: URGENT CARE | Facility: URGENT CARE | Age: 38
End: 2023-12-17
Payer: COMMERCIAL

## 2023-12-17 VITALS
HEART RATE: 109 BPM | DIASTOLIC BLOOD PRESSURE: 64 MMHG | WEIGHT: 135.44 LBS | BODY MASS INDEX: 19.71 KG/M2 | SYSTOLIC BLOOD PRESSURE: 93 MMHG | OXYGEN SATURATION: 97 % | RESPIRATION RATE: 16 BRPM | TEMPERATURE: 97 F

## 2023-12-17 DIAGNOSIS — H66.002 ACUTE SUPPURATIVE OTITIS MEDIA OF LEFT EAR WITHOUT SPONTANEOUS RUPTURE OF TYMPANIC MEMBRANE, RECURRENCE NOT SPECIFIED: Primary | ICD-10-CM

## 2023-12-17 DIAGNOSIS — J06.9 VIRAL UPPER RESPIRATORY TRACT INFECTION WITH COUGH: ICD-10-CM

## 2023-12-17 PROCEDURE — 99213 OFFICE O/P EST LOW 20 MIN: CPT | Performed by: PHYSICIAN ASSISTANT

## 2023-12-17 RX ORDER — BENZONATATE 200 MG/1
200 CAPSULE ORAL 3 TIMES DAILY PRN
Qty: 30 CAPSULE | Refills: 0 | Status: SHIPPED | OUTPATIENT
Start: 2023-12-17 | End: 2024-05-31

## 2023-12-17 ASSESSMENT — ENCOUNTER SYMPTOMS
NECK STIFFNESS: 0
PALPITATIONS: 0
FEVER: 0
BACK PAIN: 0
DIARRHEA: 0
ENDOCRINE NEGATIVE: 1
ALLERGIC/IMMUNOLOGIC NEGATIVE: 1
WOUND: 0
MUSCULOSKELETAL NEGATIVE: 1
CHILLS: 0
HEADACHES: 0
SORE THROAT: 0
JOINT SWELLING: 0
COUGH: 1
VOMITING: 0
NAUSEA: 0
MYALGIAS: 0
ARTHRALGIAS: 0
WEAKNESS: 0
LIGHT-HEADEDNESS: 0
DIZZINESS: 0
CARDIOVASCULAR NEGATIVE: 1
RHINORRHEA: 0
SHORTNESS OF BREATH: 0
EYES NEGATIVE: 1
NECK PAIN: 0

## 2023-12-17 NOTE — PROGRESS NOTES
Chief Complaint:    Chief Complaint   Patient presents with    Urgent Care     Urgent care visit for possible ear infection.    Otalgia     Left ear pain since 2am this morning that woke her up.    Cough     Cough since November 17th, Had Keflex for breast augmentation surgery starting on 11/29. Cough has been present for six weeks. [facility] of last [facility] and inhaled dust followed by a coughing fit, since she has had multiple extreme coughing fits.        ASSESSMENT    Vital signs reviewed by Brant Mckeon PA-C  BP 93/64 (BP Location: Left arm, Patient Position: Sitting, Cuff Size: Adult Regular)   Pulse 109   Temp 97  F (36.1  C) (Oral)   Resp 16   Wt 61.4 kg (135 lb 7 oz)   LMP 11/28/2023 (Exact Date)   SpO2 97%   Breastfeeding No   BMI 19.71 kg/m       1. Acute suppurative otitis media of left ear without spontaneous rupture of tympanic membrane, recurrence not specified    2. Viral upper respiratory tract infection with cough         PLAN    Patient is afebrile with stable vital signs.    Rx for Augmentin sent in for L ear infection.  Rx for Tessalon sent in for cough.  Fluids, vaporizer, acetaminophen, and or ibuprofen for pain.  Follow up with PCP if symptoms are not improving in 1 week. Sooner if symptoms worsen.   Worrisome symptoms discussed with instructions to go to the ED.  Patient verbalized understanding and agreed with this plan.     LABS:    No results found for any visits on 12/17/23.    Respiratory History  occasional episodes of bronchitis    Current Meds    Current Outpatient Medications:     amoxicillin-clavulanate (AUGMENTIN) 875-125 MG tablet, Take 1 tablet by mouth 2 times daily for 10 days, Disp: 20 tablet, Rfl: 0    benzonatate (TESSALON) 200 MG capsule, Take 1 capsule (200 mg) by mouth 3 times daily as needed for cough, Disp: 30 capsule, Rfl: 0    liothyronine (CYTOMEL) 5 MCG tablet, Take 1 tablet (5 mcg) by mouth daily, Disp: , Rfl:     Problem  history  Patient Active Problem List   Diagnosis    Decreased fetal movement    Palpitations       Allergies  Allergies   Allergen Reactions    Morphine Other (See Comments)     Crawling skin        SUBJECTIVE    HPI:Sarah M Schlatter is an 38 year old female who presents for possible ear infection. Symptoms include ear pain on left. Onset 6 hours, gradually worsening since that time. Ear history: few episodes of otitis.    Patient is eating and drinking well.  No fever, diarrhea or vomiting.  No cough, or wheezing.    ROS:    Review of Systems   Constitutional:  Negative for chills and fever.   HENT:  Positive for congestion and ear pain. Negative for rhinorrhea and sore throat.    Eyes: Negative.    Respiratory:  Positive for cough. Negative for shortness of breath.    Cardiovascular: Negative.  Negative for chest pain and palpitations.   Gastrointestinal:  Negative for diarrhea, nausea and vomiting.   Endocrine: Negative.    Genitourinary: Negative.    Musculoskeletal: Negative.  Negative for arthralgias, back pain, joint swelling, myalgias, neck pain and neck stiffness.   Skin: Negative.  Negative for rash and wound.   Allergic/Immunologic: Negative.  Negative for immunocompromised state.   Neurological:  Negative for dizziness, weakness, light-headedness and headaches.        Family History   Family History   Problem Relation Age of Onset    Breast Cancer Mother         DCIS 2017 and currently having treatment for multiple myeloma    Osteoporosis Mother     Cancer Father     Prostate Cancer Father     Depression Sister         Social History  Social History     Socioeconomic History    Marital status:      Spouse name: Not on file    Number of children: Not on file    Years of education: Not on file    Highest education level: Not on file   Occupational History    Not on file   Tobacco Use    Smoking status: Never     Passive exposure: Past (Dad smoked while she was growing up, her grandparents did as  well.)    Smokeless tobacco: Never   Substance and Sexual Activity    Alcohol use: Yes     Comment: occassional    Drug use: No    Sexual activity: Yes     Partners: Male     Birth control/protection: Other     Comment: Bilateral salpingectomy   Other Topics Concern    Parent/sibling w/ CABG, MI or angioplasty before 65F 55M? No   Social History Narrative    Not on file     Social Determinants of Health     Financial Resource Strain: Low Risk  (11/21/2023)    Financial Resource Strain     Within the past 12 months, have you or your family members you live with been unable to get utilities (heat, electricity) when it was really needed?: No   Food Insecurity: Low Risk  (11/21/2023)    Food Insecurity     Within the past 12 months, did you worry that your food would run out before you got money to buy more?: No     Within the past 12 months, did the food you bought just not last and you didn t have money to get more?: No   Transportation Needs: Low Risk  (11/21/2023)    Transportation Needs     Within the past 12 months, has lack of transportation kept you from medical appointments, getting your medicines, non-medical meetings or appointments, work, or from getting things that you need?: No   Physical Activity: Not on file   Stress: Not on file   Social Connections: Not on file   Interpersonal Safety: Low Risk  (11/22/2023)    Interpersonal Safety     Do you feel physically and emotionally safe where you currently live?: Yes     Within the past 12 months, have you been hit, slapped, kicked or otherwise physically hurt by someone?: No     Within the past 12 months, have you been humiliated or emotionally abused in other ways by your partner or ex-partner?: No   Housing Stability: Low Risk  (11/21/2023)    Housing Stability     Do you have housing? : Yes     Are you worried about losing your housing?: No        OBJECTIVE     Physical Exam:     Vital signs reviewed by Brant Mckeon PA-C  BP 93/64 (BP Location: Left  arm, Patient Position: Sitting, Cuff Size: Adult Regular)   Pulse 109   Temp 97  F (36.1  C) (Oral)   Resp 16   Wt 61.4 kg (135 lb 7 oz)   LMP 11/28/2023 (Exact Date)   SpO2 97%   Breastfeeding No   BMI 19.71 kg/m       Physical Exam:    Physical Exam  Vitals and nursing note reviewed.   Constitutional:       General: She is not in acute distress.     Appearance: She is well-developed. She is not ill-appearing, toxic-appearing or diaphoretic.   HENT:      Head: Normocephalic and atraumatic.      Right Ear: Hearing, tympanic membrane, ear canal and external ear normal. No drainage, swelling or tenderness. Tympanic membrane is not perforated, erythematous, retracted or bulging.      Left Ear: Hearing, ear canal and external ear normal. No drainage, swelling or tenderness. Tympanic membrane is erythematous and bulging. Tympanic membrane is not perforated or retracted.      Nose: Congestion present. No mucosal edema or rhinorrhea.      Right Sinus: No maxillary sinus tenderness or frontal sinus tenderness.      Left Sinus: No maxillary sinus tenderness or frontal sinus tenderness.      Mouth/Throat:      Pharynx: Posterior oropharyngeal erythema present. No pharyngeal swelling, oropharyngeal exudate or uvula swelling.      Tonsils: No tonsillar exudate or tonsillar abscesses. 0 on the right. 0 on the left.   Eyes:      General:         Right eye: No discharge.         Left eye: No discharge.      Pupils: Pupils are equal, round, and reactive to light.   Cardiovascular:      Rate and Rhythm: Normal rate and regular rhythm.      Heart sounds: Normal heart sounds. No murmur heard.     No friction rub. No gallop.   Pulmonary:      Effort: Pulmonary effort is normal. No respiratory distress.      Breath sounds: Normal breath sounds. No decreased breath sounds, wheezing, rhonchi or rales.   Chest:      Chest wall: No tenderness.   Abdominal:      General: Bowel sounds are normal. There is no distension.       Palpations: Abdomen is soft. There is no mass.      Tenderness: There is no abdominal tenderness. There is no guarding.   Musculoskeletal:      Cervical back: Normal range of motion and neck supple.   Lymphadenopathy:      Head:      Right side of head: No submental, submandibular, tonsillar, preauricular or posterior auricular adenopathy.      Left side of head: No submental, submandibular, tonsillar, preauricular or posterior auricular adenopathy.      Cervical: No cervical adenopathy.      Right cervical: No superficial or posterior cervical adenopathy.     Left cervical: No superficial or posterior cervical adenopathy.   Skin:     General: Skin is warm and dry.      Findings: No rash.   Neurological:      Mental Status: She is alert and oriented to person, place, and time.      Cranial Nerves: No cranial nerve deficit.      Deep Tendon Reflexes: Reflexes are normal and symmetric.   Psychiatric:         Behavior: Behavior normal. Behavior is cooperative.         Thought Content: Thought content normal.         Judgment: Judgment normal.            Brant Mckeon PA-C  12/17/2023, 9:55 AM

## 2024-03-25 ENCOUNTER — VIRTUAL VISIT (OUTPATIENT)
Dept: ONCOLOGY | Facility: CLINIC | Age: 39
End: 2024-03-25
Attending: GENETIC COUNSELOR, MS
Payer: COMMERCIAL

## 2024-03-25 DIAGNOSIS — Z80.42 FAMILY HISTORY OF PROSTATE CANCER: ICD-10-CM

## 2024-03-25 DIAGNOSIS — Z80.3 FAMILY HISTORY OF MALIGNANT NEOPLASM OF BREAST: Primary | ICD-10-CM

## 2024-03-25 PROCEDURE — 96040 HC GENETIC COUNSELING, EACH 30 MINUTES: CPT | Mod: GT,95 | Performed by: GENETIC COUNSELOR, MS

## 2024-03-25 NOTE — LETTER
3/25/2024         RE: Sarah M Schlatter  [address] [place] [address]        Dear Colleague,    Thank you for referring your patient, Sarah M Schlatter, to the [facility]. Please see a copy of my visit note below.    Virtual Visit Details    Type of service:  Video Visit     Originating Location (pt. Location): Home  Distant Location (provider location):  Off-site  Platform used for Video Visit: [employer]  Time spent over video: 40 minutes    3/25/2024    Referring Provider: Marilyn Guillen PA-C    Presenting Information:   I spoke with Sarah Schlatter over video today for a genetic counseling follow up visit. This appointment was conducted virtually due to COVID-19 precautions. We talked today to review this history, cancer screening recommendations, and available genetic testing options.    Personal History:  Arpita is a 38 year old female. She does not have any personal history of cancer.     She had her first menstrual period at age 14, her first child at age 31, and is premenopausal. Arpita has her ovaries and uterus in place. She had a bilateral salpingectomy on 11/18/21. She reports that she has not used hormone replacement therapy. She has used oral contraceptives. She had a bilateral diagnostic mammogram and left breast ultrasound on 10/31/23 which was benign (left breast cyst noted). She reports that she had breast augmentation in November 2023. Arpita has not had a colonoscopy due to her age. Arpita reported no history of tobacco use and alcohol use of 1 drink per week. 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     I previously saw Arpita for a genetic counseling visit along with her father on 12/11/2019. At that time, her father elected to proceed with genetic testing via a CustomNext-Cancer panel (a combination of the CancerNext panel  plus three additional genes in which mutations are associated with an increased risk for colon polyps) offered by [facility]. Arpita elected not to proceed with genetic testing at that time and to wait until we had her father's results to determine whether genetic testing for her would be necessary. Her father signed a release form to allow me to discuss his genetic test results with Arpita when they were available. Her father was negative for mutations in the 37 genes analyzed: APC, KIMBER, AXIN2, BARD1, BMPR1A, BRCA1, BRCA2, BRIP1, CDH1, CDK4, CDKN2A, CHEK2, DICER1, HOXB13, MLH1, MRE11A, MSH2, MSH3, MSH6, MUTYH, NBN, NF1, NTHL1, PALB2, PMS2, POLD1, POLE, PTEN, RAD50, RAD51C, RAD51D, SMAD4, SMARCA4, STK11 and TP53 (sequencing and deletion/duplication); EPCAM and GREM1 (deletion/duplication only).       Family History: (Please see scanned pedigree for detailed family history information)  Below is the family history information obtained at our visit in 2019, updates from today included in bold:   Siblings:  She has one sister (age 40) and one brother (age 31) with no known history of cancer.    Maternal:  Her mother, Dorina, was diagnosed with breast cancer at age 55. She was seen for genetic counseling and testing at [facility] in 2017. She gave verbal permission for her genetic testing results to be reviewed today. The Breast Cancer Expanded panel testing was performed at [facility] (genes analyzed: KIMBER, BARD1, BRCA1, BRCA2, BRIP1, CDH1, CHEK2, MRE11A, MUTYH, NBN, NF1, PALB2, PTEN, RAD50, RAD51C, RAD51D, STK11, TP53). No pathogenic mutations were detected. A variant of uncertain significance was detected in the CDH1 gene ([id number]). We reviewed that a variant of uncertain significance (VUS) is a variation in a gene, but it is unclear how this impacts cancer risk in the family. We discussed that medical management for individuals is typically not changed on the  basis of a VUS. Dorina was diagnosed with multiple myeloma at age 61. Treatment has included autologous bone marrow transplant, radiation, chemotherapy. She is currently 63 years old.    She reports today that since her mother's genetic counseling appointment, her maternal uncle was recently diagnosed with prostate cancer at age 59. She reports that this is not an aggressive prostate cancer. He is now 63 years old.   Her maternal grandfather is in his 80s and was diagnosed with prostate cancer at age 63 and treated with surgery. She reports that this was not an aggressive prostate cancer.  Her grandfather had a sister (Arpita's great-aunt) who was diagnosed with breast cancer in her 40s and passed away in her 70s  Another great-aunt has no known history of cancer. She has a daughter who was diagnosed with breast cancer at age 47 and has reportedly had negative genetic testing. This great-aunt also has a son who was diagnosed with testicular cancer in his 20s or 30s.  Her maternal great-grandfather (grandmother's father) was diagnosed with throat cancer in his 70s. He had a history of smoking.   Paternal:  Her father was also present for a joint genetic counseling visit today. He was recently diagnosed with prostate cancer (oskar score of 7) in November 2019 at age 58. He will be having surgery in January 2020. He also has a history of colon polyps (at least one tubular adenoma). He did have his surgery and radiation after our last visit. He also elected to have genetic testing at the time of our visit in 2019 (see results above).  Her uncle is 62 years old and was diagnosed with prostate cancer at age 60 or 61. Arpita reports that this was an aggressive cancer (like her father's). Treatment included surgery.   Her grandmother has no known history of cancer. She has had colon polyps. She passed away in her late 70s or early 80s. She may have had a lot of polyps, but she also had diverticulitis, so it is unclear how  many polyps she had.    Her grandmother's sister (Arpita's great-aunt) passed away in her 80s due to an unknown cancer type.   Her grandmother had five brothers who have all passed away. One of these uncles passed away at a younger age and may have had cancer.  Her grandmother's father passed away in his 50s or 60s due to bone cancer diagnosed in his 50s or 60s.  Her grandfather passed away at age 67 with no known history of cancer. He had COPD and a history of smoking.    Her grandfather had three brothers and one sister who have all passed away. One these brothers may have had cancer.   Arpita's father reports limited contact with many of his relatives.    Her maternal ethnicity is [place], [place]. Her paternal ethnicity is [place]. There is no known Ashkenazi Rastafarian ancestry on either side of her family.     Discussion:  We reviewed the implications of Arpita's mother's and father's genetic testing results for Arpita and her siblings. As noted above, her mother's testing identified no mutations (one VUS in the CDH1 gene) and her father's testing was negative.   We reviewed that a variant of uncertain significance (VUS) is a variation in a gene, but it is unclear how it impacts cancer risk in a family. It may be a benign change that does not increase cancer risk, or it may be a harmful mutation that causes an increased risk for certain cancers. We discussed that an individual's medical management is typically not changed on the basis of a VUS. Testing family members for variants of uncertain significance is not typically recommended.  We discussed several different interpretations of her parents' test results:  One explanation may be that there is a different gene (that has not yet been discovered) or combination of genes and environment that are associated with the cancers in this family.  There is also a small possibility that there is a mutation in one of these genes, and the testing laboratory could not find it with  their current testing methods.     As both of Arpita's parents have had comprehensive genetic testing (father's testing comprehensive for genes associated with prostate and multiple other cancer types, mother's testing comprehensive for genes associated with breast cancer) with no mutations identified, we discussed that genetic testing for Arpita and her siblings is not indicated at this time. At this time, we do not have an explanation for Arpita's family history of cancer. However, Arpita may still be at risk for certain cancers due to family history, environmental factors, or other genetic causes not identified by her parents' testing. Because of that, it is important that she continue with cancer screening based on her personal and family history:  We reviewed her current estimated lifetime risk for breast cancer today. Based on the personal and family history information she provided, Arpita has an estimated 23.6% lifetime risk of developing breast cancer based on the CARLOS Risk Evaluation v8 model. As such, Arpita meets current National Comprehensive Cancer Network (NCCN) guidelines for high risk breast screening. This includes annual breast MRI in addition to annual mammogram, alternating every 6 months. However, we discussed that breast cancer screening is generally recommended to begin approximately 10 years younger than the earliest age of breast cancer diagnosis in the family, or at age 40, whichever comes first. Based on her mother's diagnosis at age 55, screening for Arpita may begin at age 40. As she is 38 years old at this time, we discussed that her breast cancer risk may change by the time she is 41 (due to multiple possible personal and/or family history factors). As she has already had some breast imaging, we discussed the option for Arpita to participate in our Cancer Risk Management Program in which our nursing specialist provides an individual screening plan and assists with medical management. We  reviewed the option to do this now if she would like to review future screening options, or to wait until she is closer to age 41 when her breast imaging will begin. She elected to wait until she is closer to 41 and is encouraged to reach out to me to reassess her breast cancer risk at that time.       Other population cancer screening options, such as those recommended by the American Cancer Society and the National Comprehensive Cancer Network (NCCN), are also appropriate for Arpita and her family.    Final screening recommendations should be made by each individual's primary care provider.   Screening recommendations may change if there are changes in her personal or family history, or if there is a gene mutation detected in Arpita or her family members.  We discussed that genetic testing is rapidly advancing, and new cancer susceptibility genes will most likely be identified in the future. Therefore, Arpita's mother and father should contact their genetic counselor regularly or if there are changes in their personal or family history, to review potential updated genetic testing options.     Plan:  1) No genetic testing was ordered today. Both of Arpita's parents have already had comprehensive genetic testing with no mutations identified, therefore, genetic testing for Arpita is not indicated at this time.  2) Arpita should continue with her cancer screening based on her personal and family history factors, as outlined above.    3) She is encouraged to contact me if there are any changes to her personal or family history, or to reassess her breast cancer risk when she is 41 years old.     Saritha Ureña MS, [facility] [place]  Licensed, Certified Genetic Counselor  Office: [phone]  Email: [email or web address]

## 2024-03-25 NOTE — PROGRESS NOTES
Virtual Visit Details    Type of service:  Video Visit     Originating Location (pt. Location): Home  Distant Location (provider location):  Off-site  Platform used for Video Visit: Jose Alejandro  Time spent over video: 40 minutes    3/25/2024    Referring Provider: Marilyn Guillen PA-C    Presenting Information:   I spoke with Sarah Schlatter over video today for a genetic counseling follow up visit. This appointment was conducted virtually due to COVID-19 precautions. We talked today to review this history, cancer screening recommendations, and available genetic testing options.    Personal History:  Arpita is a 38 year old female. She does not have any personal history of cancer.     She had her first menstrual period at age 14, her first child at age 31, and is premenopausal. Arpita has her ovaries and uterus in place. She had a bilateral salpingectomy on 11/18/21. She reports that she has not used hormone replacement therapy. She has used oral contraceptives. She had a bilateral diagnostic mammogram and left breast ultrasound on 10/31/23 which was benign (left breast cyst noted). She reports that she had breast augmentation in November 2023. Arpita has not had a colonoscopy due to her age. Arpita reported no history of tobacco use and alcohol use of 1 drink per week. 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     I previously saw Arpita for a genetic counseling visit along with her father on 12/11/2019. At that time, her father elected to proceed with genetic testing via a CustomNext-Cancer panel (a combination of the CancerNext panel plus three additional genes in which mutations are associated with an increased risk for colon polyps) offered by [facility]. Arpita elected not to proceed with genetic testing at that time and to wait until we had her father's results to determine whether  genetic testing for her would be necessary. Her father signed a release form to allow me to discuss his genetic test results with Arpita when they were available. Her father was negative for mutations in the 37 genes analyzed: APC, KIMBER, AXIN2, BARD1, BMPR1A, BRCA1, BRCA2, BRIP1, CDH1, CDK4, CDKN2A, CHEK2, DICER1, HOXB13, MLH1, MRE11A, MSH2, MSH3, MSH6, MUTYH, NBN, NF1, NTHL1, PALB2, PMS2, POLD1, POLE, PTEN, RAD50, RAD51C, RAD51D, SMAD4, SMARCA4, STK11 and TP53 (sequencing and deletion/duplication); EPCAM and GREM1 (deletion/duplication only).       Family History: (Please see scanned pedigree for detailed family history information)  Below is the family history information obtained at our visit in 2019, updates from today included in bold:   Siblings:    She has one sister (age 40) and one brother (age 31) with no known history of cancer.    Maternal:    Her mother, Dorina, was diagnosed with breast cancer at age 55. She was seen for genetic counseling and testing at [facility] in 2017. She gave verbal permission for her genetic testing results to be reviewed today. The Breast Cancer Expanded panel testing was performed at the [facility] (genes analyzed: KIMBER, BARD1, BRCA1, BRCA2, BRIP1, CDH1, CHEK2, MRE11A, MUTYH, NBN, NF1, PALB2, PTEN, RAD50, RAD51C, RAD51D, STK11, TP53). No pathogenic mutations were detected. A variant of uncertain significance was detected in the CDH1 gene ([id number]). We reviewed that a variant of uncertain significance (VUS) is a variation in a gene, but it is unclear how this impacts cancer risk in the family. We discussed that medical management for individuals is typically not changed on the basis of a VUS. Dorina was diagnosed with multiple myeloma at age 61. Treatment has included autologous bone marrow transplant, radiation, chemotherapy. She is currently 63 years old.      She reports today that since her mother's genetic counseling  appointment, her maternal uncle was recently diagnosed with prostate cancer at age 59. She reports that this is not an aggressive prostate cancer. He is now 63 years old.     Her maternal grandfather is in his 80s and was diagnosed with prostate cancer at age 63 and treated with surgery. She reports that this was not an aggressive prostate cancer.    Her grandfather had a sister (Arpita's great-aunt) who was diagnosed with breast cancer in her 40s and passed away in her 70s    Another great-aunt has no known history of cancer. She has a daughter who was diagnosed with breast cancer at age 47 and has reportedly had negative genetic testing. This great-aunt also has a son who was diagnosed with testicular cancer in his 20s or 30s.    Her maternal great-grandfather (grandmother's father) was diagnosed with throat cancer in his 70s. He had a history of smoking.   Paternal:    Her father was also present for a joint genetic counseling visit today. He was recently diagnosed with prostate cancer (oskar score of 7) in November 2019 at age 58. He will be having surgery in January 2020. He also has a history of colon polyps (at least one tubular adenoma). He did have his surgery and radiation after our last visit. He also elected to have genetic testing at the time of our visit in 2019 (see results above).    Her uncle is 62 years old and was diagnosed with prostate cancer at age 60 or 61. Arpita reports that this was an aggressive cancer (like her father's). Treatment included surgery.     Her grandmother has no known history of cancer. She has had colon polyps. She passed away in her late 70s or early 80s. She may have had a lot of polyps, but she also had diverticulitis, so it is unclear how many polyps she had.      Her grandmother's sister (Arpita's great-aunt) passed away in her 80s due to an unknown cancer type.     Her grandmother had five brothers who have all passed away. One of these uncles passed away at a younger  age and may have had cancer.    Her grandmother's father passed away in his 50s or 60s due to bone cancer diagnosed in his 50s or 60s.    Her grandfather passed away at age 67 with no known history of cancer. He had COPD and a history of smoking.      Her grandfather had three brothers and one sister who have all passed away. One these brothers may have had cancer.     Arpita's father reports limited contact with many of his relatives.    Her maternal ethnicity is [place], [place]. Her paternal ethnicity is [place]ian. There is no known Ashkenazi Zoroastrianism ancestry on either side of her family.     Discussion:    We reviewed the implications of Arpita's mother's and father's genetic testing results for Arpita and her siblings. As noted above, her mother's testing identified no mutations (one VUS in the CDH1 gene) and her father's testing was negative.     We reviewed that a variant of uncertain significance (VUS) is a variation in a gene, but it is unclear how it impacts cancer risk in a family. It may be a benign change that does not increase cancer risk, or it may be a harmful mutation that causes an increased risk for certain cancers. We discussed that an individual's medical management is typically not changed on the basis of a VUS. Testing family members for variants of uncertain significance is not typically recommended.    We discussed several different interpretations of her parents' test results:    One explanation may be that there is a different gene (that has not yet been discovered) or combination of genes and environment that are associated with the cancers in this family.    There is also a small possibility that there is a mutation in one of these genes, and the testing laboratory could not find it with their current testing methods.     As both of Arpita's parents have had comprehensive genetic testing (father's testing comprehensive for genes associated with prostate and multiple other cancer types, mother's  testing comprehensive for genes associated with breast cancer) with no mutations identified, we discussed that genetic testing for Arpita and her siblings is not indicated at this time. At this time, we do not have an explanation for Arpita's family history of cancer. However, Arpita may still be at risk for certain cancers due to family history, environmental factors, or other genetic causes not identified by her parents' testing. Because of that, it is important that she continue with cancer screening based on her personal and family history:    We reviewed her current estimated lifetime risk for breast cancer today. Based on the personal and family history information she provided, Arpita has an estimated 23.6% lifetime risk of developing breast cancer based on the CARLOS Risk Evaluation v8 model. As such, Arpita meets current National Comprehensive Cancer Network (NCCN) guidelines for high risk breast screening. This includes annual breast MRI in addition to annual mammogram, alternating every 6 months. However, we discussed that breast cancer screening is generally recommended to begin approximately 10 years younger than the earliest age of breast cancer diagnosis in the family, or at age 40, whichever comes first. Based on her mother's diagnosis at age 55, screening for Arpita may begin at age 40. As she is 38 years old at this time, we discussed that her breast cancer risk may change by the time she is 41 (due to multiple possible personal and/or family history factors). As she has already had some breast imaging, we discussed the option for Arpita to participate in our Cancer Risk Management Program in which our nursing specialist provides an individual screening plan and assists with medical management. We reviewed the option to do this now if she would like to review future screening options, or to wait until she is closer to age 41 when her breast imaging will begin. She elected to wait until she is closer to 41  and is encouraged to reach out to me to reassess her breast cancer risk at that time.         Other population cancer screening options, such as those recommended by the American Cancer Society and the National Comprehensive Cancer Network (NCCN), are also appropriate for Arpita and her family.      Final screening recommendations should be made by each individual's primary care provider.   Screening recommendations may change if there are changes in her personal or family history, or if there is a gene mutation detected in Arpita or her family members.  We discussed that genetic testing is rapidly advancing, and new cancer susceptibility genes will most likely be identified in the future. Therefore, Arpita's mother and father should contact their genetic counselor regularly or if there are changes in their personal or family history, to review potential updated genetic testing options.     Plan:  1) No genetic testing was ordered today. Both of Arpita's parents have already had comprehensive genetic testing with no mutations identified, therefore, genetic testing for Arpita is not indicated at this time.  2) Arpita should continue with her cancer screening based on her personal and family history factors, as outlined above.    3) She is encouraged to contact me if there are any changes to her personal or family history, or to reassess her breast cancer risk when she is 41 years old.     Saritha Ureña MS, [facility] [place]  Licensed, Certified Genetic Counselor  Office: [phone]  Email: [email or web address]

## 2024-03-25 NOTE — PROGRESS NOTES
"Virtual Visit Details    Type of service:  Video Visit     Originating Location (pt. Location): {video visit patient location:317422::\"Home\"}  {PROVIDER LOCATION On-site should be selected for visits conducted from your clinic location or adjoining [facility] hospital, academic office, or other nearby [facility] building. Off-site should be selected for all other provider locations, including home:[id number]}  Distant Location (provider location):  {virtual location provider:248676}  Platform used for Video Visit: {Virtual Visit Platforms:787692::\"Subway\"}  "

## 2024-03-25 NOTE — NURSING NOTE
Is the patient currently in the state of [place]? YES    Visit mode:VIDEO    If the visit is dropped, the patient can be reconnected by: VIDEO VISIT: Text to cell phone:   Telephone Information:   Mobile [phone]       Will anyone else be joining the visit? NO  (If patient encounters technical issues they should call [phone])    How would you like to obtain your AVS? MyChart    Are changes needed to the allergy or medication list? N/A    Reason for visit: Consult    Emerita GARCIA

## 2024-05-02 NOTE — TELEPHONE ENCOUNTER
7day Ziopatch resulted today, noted primarily SR avg HR 80bpm, <1% PAC/PVCs. Interpretors note said that patient's activation of monitor alert for symptoms correlated with SR/ST and occasionally a single ectopic beat. No arrhythmias. Routed to Dr Ruth for review.    Progress Note - Geriatric Medicine   Jeffery Keys 87 y.o. male MRN: [id number]  Unit/Bed#: S -01 Encounter: [id number]      Assessment/Plan:  Dysphoric mood  Assessment & Plan  Started on Lexapro 5 mg daily  Continue supportive care    Cognitive impairment  Assessment & Plan  Currently stable, seems to be slightly depressed/anxious  Will continue to provide supportive care, reorient as needed.  Patient is at high risk for delirium, will monitor closely and place on delirium precautions.  Maintain sleep/wake cycle.  Optimize pain regimen.  Monitor for constipation and urinary retention and manage as needed.  B12 level less than 400 start supplements, TSH within normal limits  Encourage family to visit.  Encourage to wear glasses and hearing aids while awake.  Encourage po intake, assist with feeding if needed.   Per chart review patient scored 17 on PHQ with PCP in February 2024 and he is a poor historian in general.  Start Lexapro 5 mg daily provide supportive care  Psychiatry consult pending  Scored 1/5 on mini cog, CT head shows chronic angiopathic changes  Recommend follow-up with geriatrics as outpatient and fit to drive test on discharge      Polycythemia  Assessment & Plan  Consider further workup  Monitor CBC    Leukocytosis  Assessment & Plan  Slowly trending down most likely in context of bacteremia  Continue to monitor    Bacteremia  Assessment & Plan  Blood culture positive for Klebsiella  Continue antibiotics-currently on Ancef  Manage as per primary team    UTI (urinary tract infection)  Assessment & Plan  CT chest abdomen and pelvis showed diffuse bladder wall thickening-consider further workup with urology  Urine culture no growth  Blood culture positive for Klebsiella, continue antibiotic regimen as per primary team    Ambulatory dysfunction  Assessment & Plan  Patient uses a cane for ambulation at baseline  He reports falls in the past year  Monitor orthostatic vital signs  Encourage p.o.  hydration  Avoid hypotension and hypoglycemia   Consider telemetry monitoring while in the hospital    DM2 (diabetes mellitus, type 2) ([facility])  Assessment & Plan  Lab Results   Component Value Date    HGBA1C 7.5 (H) 04/27/2024       Recent Labs     05/01/24  1645 05/01/24  2135 05/02/24  0806 05/02/24  1146   POCGLU 304* 283* 245* 320*       Blood Sugar Average: Last 72 hrs:  (P) [id number]    Continue to monitor Accu-Cheks, avoid hypoglycemia  Consider increasing Levemir to 30 units daily-Home dose  Hold metformin while in the hospital    * Acute metabolic encephalopathy  Assessment & Plan  - Per chart review patient was alert oriented x 4 on admission to the hospital, the time of encounter he was alert oriented x 3, patient not able to remember why and how he got to the hospital  -Had difficulty reciting days of the week backwards, but was able to do it with cues  -Patient is high risk of delirium due to bacteremia, hospitalization, cefepime use, possible cognitive impairment at baseline  -delirium precautions  -maintain normal sleep/wake cycle  -minimize overnight interruptions, group overnight vitals/labs/nursing checks as possible  -dim lights, close blinds and turn off tv to minimize stimulation and encourage sleep environment in evenings  -ensure that pain is well controlled, consider Tylenol 975mg  scheduled   -monitor for fecal and urinary retention which may precipitate delirium  -encourage early mobilization and ambulation  -provide frequent reorientation and redirection  -encourage family and friends at the bedside to help calm patient if anxious  -avoid medications which may precipitate or worsen delirium such as tramadol, benzodiazepine, anticholinergics, and antihistaminics  -encourage hydration and nutrition , assist with feeding if needed  -redirect unwanted behaviors as first line, avoid physical restraints.   -QTc 457, continue to monitor  -Patient did not require any chemical restraint  "since admission to the hospital  -I suspect cognitive impairment at baseline-consider follow-up with geriatrics as outpatient when patient is medically stable and in a familiar environment.    CT head showed chronic microangiopathic changes       Subjective:   Patient seen and examined at bedside for geriatric follow-up, states that he does not feel well however he is not able to elaborate.  Denies any pain.  States that he slept well overnight.  Appetite is fair.  Complains of dizziness/lightheadedness.  Reports bowel movement today, states that he had \"black stool\".  Denied abdominal pain nausea or vomiting at the time of encounter    Review of Systems   Constitutional:  Positive for appetite change. Negative for chills, fatigue and fever.   HENT:  Positive for hearing loss. Negative for congestion, rhinorrhea and sore throat.    Respiratory:  Negative for cough, shortness of breath and wheezing.    Cardiovascular:  Negative for chest pain.   Gastrointestinal:  Positive for constipation. Negative for abdominal pain and nausea.   Genitourinary:  Negative for dysuria and hematuria.   Musculoskeletal:  Positive for gait problem.   Skin:  Negative for rash and wound.   Allergic/Immunologic: Negative for environmental allergies.   Neurological:  Positive for dizziness. Negative for syncope.   Hematological:  Does not bruise/bleed easily.   Psychiatric/Behavioral:  Positive for confusion and dysphoric mood. Negative for behavioral problems and sleep disturbance.          Objective:     Vitals: Blood pressure 138/89, pulse 85, temperature 97.9 °F (36.6 °C), resp. rate 21, weight 81.1 kg (178 lb 12.7 oz), SpO2 98%.,Body mass index is 27.19 kg/m².      Intake/Output Summary (Last 24 hours) at 5/2/2024 1300  Last data filed at 5/2/2024 0330  Gross per 24 hour   Intake --   Output 350 ml   Net -350 ml       Current Medications: Reviewed    Physical Exam:   Physical Exam  Vitals and nursing note reviewed.   Constitutional:  "      General: He is not in acute distress.     Appearance: He is well-developed.      Comments: Frail looking   HENT:      Head: Normocephalic and atraumatic.      Ears:      Comments: Eyak     Mouth/Throat:      Mouth: Mucous membranes are dry.      Comments: Poor dentition  Eyes:      Conjunctiva/sclera: Conjunctivae normal.   Neck:      Comments: Mass R side neck  Cardiovascular:      Rate and Rhythm: Normal rate and regular rhythm.      Heart sounds: No murmur heard.  Pulmonary:      Effort: Pulmonary effort is normal. No respiratory distress.      Breath sounds: Normal breath sounds.   Abdominal:      Palpations: Abdomen is soft.      Tenderness: There is no abdominal tenderness.   Musculoskeletal:         General: No swelling.      Cervical back: Neck supple.      Right lower leg: No edema.      Left lower leg: No edema.   Skin:     General: Skin is warm and dry.      Capillary Refill: Capillary refill takes less than 2 seconds.      Findings: Bruising present.   Neurological:      Mental Status: He is alert and oriented to person, place, and time.      Comments: Patient continues to c/o confusion and he does not remember how he got to the hospital   Psychiatric:         Mood and Affect: Mood normal.          Invasive Devices       Peripheral Intravenous Line  Duration             Peripheral IV 04/27/24 Right Antecubital 4 days              Drain  Duration             External Urinary Catheter Medium 4 days                    Lab, Imaging and other studies: I have personally reviewed pertinent reports.

## 2024-05-31 ENCOUNTER — OFFICE VISIT (OUTPATIENT)
Dept: FAMILY MEDICINE | Facility: OTHER | Age: 39
End: 2024-05-31
Payer: COMMERCIAL

## 2024-05-31 VITALS
WEIGHT: 153 LBS | SYSTOLIC BLOOD PRESSURE: 108 MMHG | HEART RATE: 75 BPM | HEIGHT: 69 IN | OXYGEN SATURATION: 97 % | RESPIRATION RATE: 20 BRPM | DIASTOLIC BLOOD PRESSURE: 62 MMHG | BODY MASS INDEX: 22.66 KG/M2 | TEMPERATURE: 98.3 F

## 2024-05-31 DIAGNOSIS — Z51.81 ENCOUNTER FOR THERAPEUTIC DRUG MONITORING: ICD-10-CM

## 2024-05-31 DIAGNOSIS — F90.2 ATTENTION DEFICIT HYPERACTIVITY DISORDER (ADHD), COMBINED TYPE: Primary | ICD-10-CM

## 2024-05-31 DIAGNOSIS — I44.7 LBBB (LEFT BUNDLE BRANCH BLOCK): ICD-10-CM

## 2024-05-31 LAB
AMPHETAMINES UR QL SCN: NORMAL
BARBITURATES UR QL SCN: NORMAL
BENZODIAZ UR QL SCN: NORMAL
BZE UR QL SCN: NORMAL
CANNABINOIDS UR QL SCN: NORMAL
FENTANYL UR QL: NORMAL
OPIATES UR QL SCN: NORMAL
PCP QUAL URINE (ROCHE): NORMAL

## 2024-05-31 PROCEDURE — 80307 DRUG TEST PRSMV CHEM ANLYZR: CPT | Performed by: FAMILY MEDICINE

## 2024-05-31 PROCEDURE — 99214 OFFICE O/P EST MOD 30 MIN: CPT | Performed by: FAMILY MEDICINE

## 2024-05-31 PROCEDURE — 93000 ELECTROCARDIOGRAM COMPLETE: CPT | Performed by: FAMILY MEDICINE

## 2024-05-31 ASSESSMENT — PAIN SCALES - GENERAL: PAINLEVEL: NO PAIN (0)

## 2024-05-31 NOTE — PROGRESS NOTES
Assessment & Plan       ICD-10-CM    1. Attention deficit hyperactivity disorder (ADHD), combined type  F90.2 Urine Drug Screen     EKG 12-lead complete w/read - Clinics     Urine Drug Screen      2. Encounter for therapeutic drug monitoring  Z51.81 Urine Drug Screen     EKG 12-lead complete w/read - Clinics     Urine Drug Screen      3. LBBB (left bundle branch block)  I44.7     Rate related, seen on Ziopatch           Per outside orders and her request, EKG was obtained.  This was completely normal.  No concerning findings.  I reviewed her past workup for her tachycardia.  This showed only a rate related bundle branch block which apparently caused some reversible perfusion defects on her stress test, but CT angiogram of her coronaries was completely normal with calcium score of 0.  Discussed that she should likely do well with stimulants, but there is a slight chance that when she is started it could resume some tachycardias and rate related symptoms.      Portions of this note were completed using Dragon dictation software.  Although reviewed, there may be typographical and other inadvertent errors that remain.             Patient Instructions   Your EKG looks normal.  It is possible stimulant medication could increase your heart rate and contribute to your rate related bundle branch block.  Watch for any symptoms related to this.    Given your past coronary calcium score and CT angiogram of your arteries, I would not expect any coronary ischemia.  Of course, that can change as you get older.    We will let you know your results as soon as we can.  If you have MyChart, you will be able to see your lab and imaging results shortly after they become available.  I will review these and let you know my interpretation, but this usually takes additional time.  If you have multiple labs, I usually wait until they are all back before sending a note about them unless something is worrisome.  If you do not have MyChart,  we will let you know your lab results as soon as we can.  If they are normal, this can take up to a week.     Let us know if you need any further help with your ADHD or other symptoms.    I would recommend  getting a physical with your usual provider at some point in the next 6 months.    Contact us or return if questions or concerns.    Have a nice day!    Dr. Aranda      Marco Antonio Palm is a 39 year old, presenting for the following health issues:  Medication Therapy Management        5/31/2024     4:06 PM   Additional Questions   Roomed by Jennifer   Accompanied by Self         5/31/2024     4:06 PM   Patient Reported Additional Medications   Patient reports taking the following new medications NA     Via the Health Maintenance questionnaire, the patient has reported the following services have been completed -Cervical Cancer Screening: [facility] [place] 2022-11-18, this information has been sent to the abstraction team.  History of Present Illness       Reason for visit:  Pre medication ekg    She eats 4 or more servings of fruits and vegetables daily.She consumes 1 sweetened beverage(s) daily.She exercises with enough effort to increase her heart rate 30 to 60 minutes per day.  She exercises with enough effort to increase her heart rate 5 days per week.   She is taking medications regularly.     Pt has started seeing a NP via telehealth.  She is interested in starting her on a stimulant medication, but she wants her to have some     A few years ago, she was having bouts of tachycardia, so she had a stress test, Ziopatch, CT angiogram, etc.  They had considered starting her on a beta blocker, but we worried about     Coronary calcium score was 0.  Stress test did show an area of perfusion defect that was reversible, but felt to be likely related to HR change and LBBB.  Has a rate-dependent BBB on Ziopatch    .                    Objective    /62   Pulse 75   Temp 98.3  F (36.8  C) (Temporal)   Resp 20   Ht 1.74 m (5'  "8.5\")   Wt 69.4 kg (153 lb)   LMP 05/23/2024 (Exact Date)   SpO2 97%   BMI 22.92 kg/m    Body mass index is 22.92 kg/m .  Physical Exam   GENERAL: alert and no distress  NECK: no adenopathy, no asymmetry, masses, or scars  RESP: lungs clear to auscultation - no rales, rhonchi or wheezes  CV: regular rate and rhythm, normal S1 S2, no S3 or S4, no murmur, click or rub, no peripheral edema  ABDOMEN: soft, nontender, no hepatosplenomegaly, no masses and bowel sounds normal  MS: no gross musculoskeletal defects noted, no edema    Transferred Records on 10/04/2023   Component Date Value Ref Range Status    ALT (External) 10/04/2023 16  6 - 29 U/L Final    AST (External) 10/04/2023 18  10 - 30 U/L Final    Creatinine (External) 10/04/2023 0.72  0.50 - 0.97 mg/dL Final    GFR Estimated (External) 10/04/2023 110  >=60 mL/min/1.73m2 Final    Glucose (External) 10/04/2023 80  65 - 99 mg/dL Final    Potassium (External) 10/04/2023 4.1  3.5 - 5.3 mmol/L Final    TSH (External) 10/04/2023 2.30  see scan mIU/L Final     Results for orders placed or performed in visit on 05/31/24   Urine Drug Screen     Status: None (In process)    Narrative    The following orders were created for panel order Urine Drug Screen.  Procedure                               Abnormality         Status                     ---------                               -----------         ------                     Urine Drug Screen Panel[[id number]]                          In process                   Please view results for these tests on the individual orders.     Results for orders placed or performed in visit on 05/31/24 (from the past 24 hour(s))   Urine Drug Screen    Narrative    The following orders were created for panel order Urine Drug Screen.  Procedure                               Abnormality         Status                     ---------                               -----------         ------                     Urine Drug Screen " Panel[[id number]]                          In process                   Please view results for these tests on the individual orders.           Signed Electronically by: Kumar Aranda MD, MD

## 2024-05-31 NOTE — PATIENT INSTRUCTIONS
Your EKG looks normal.  It is possible stimulant medication could increase your heart rate and contribute to your rate related bundle branch block.  Watch for any symptoms related to this.    Given your past coronary calcium score and CT angiogram of your arteries, I would not expect any coronary ischemia.  Of course, that can change as you get older.    We will let you know your results as soon as we can.  If you have MyChart, you will be able to see your lab and imaging results shortly after they become available.  I will review these and let you know my interpretation, but this usually takes additional time.  If you have multiple labs, I usually wait until they are all back before sending a note about them unless something is worrisome.  If you do not have MyChart, we will let you know your lab results as soon as we can.  If they are normal, this can take up to a week.     Let us know if you need any further help with your ADHD or other symptoms.    I would recommend  getting a physical with your usual provider at some point in the next 6 months.    Contact us or return if questions or concerns.    Have a nice day!    Dr. Aranda

## 2024-07-13 ENCOUNTER — HEALTH MAINTENANCE LETTER (OUTPATIENT)
Age: 39
End: 2024-07-13

## 2025-01-30 ENCOUNTER — APPOINTMENT (OUTPATIENT)
Dept: CT IMAGING | Facility: CLINIC | Age: 40
End: 2025-01-30
Attending: EMERGENCY MEDICINE
Payer: COMMERCIAL

## 2025-01-30 ENCOUNTER — HOSPITAL ENCOUNTER (EMERGENCY)
Facility: CLINIC | Age: 40
Discharge: HOME | End: 2025-01-30
Attending: STUDENT IN AN ORGANIZED HEALTH CARE EDUCATION/TRAINING PROGRAM
Payer: COMMERCIAL

## 2025-01-30 VITALS
OXYGEN SATURATION: 100 % | DIASTOLIC BLOOD PRESSURE: 58 MMHG | RESPIRATION RATE: 22 BRPM | TEMPERATURE: 97.7 F | SYSTOLIC BLOOD PRESSURE: 103 MMHG | HEART RATE: 70 BPM

## 2025-01-30 DIAGNOSIS — F07.81 POST CONCUSSIVE SYNDROME: Primary | ICD-10-CM

## 2025-01-30 DIAGNOSIS — S09.90XA INJURY OF HEAD, INITIAL ENCOUNTER: ICD-10-CM

## 2025-01-30 DIAGNOSIS — F43.0 ACUTE REACTION TO STRESS: ICD-10-CM

## 2025-01-30 DIAGNOSIS — G47.00 INSOMNIA, UNSPECIFIED TYPE: ICD-10-CM

## 2025-01-30 DIAGNOSIS — R68.89 LIGHT SENSITIVITY: ICD-10-CM

## 2025-01-30 DIAGNOSIS — R51.9 NONINTRACTABLE HEADACHE, UNSPECIFIED CHRONICITY PATTERN, UNSPECIFIED HEADACHE TYPE: ICD-10-CM

## 2025-01-30 DIAGNOSIS — R41.89 BRAIN FOG: ICD-10-CM

## 2025-01-30 PROBLEM — Z98.891 HISTORY OF UTERINE SCAR FROM PREVIOUS SURGERY: Status: ACTIVE | Noted: 2017-02-17

## 2025-01-30 PROBLEM — O36.8190 DECREASED FETAL MOVEMENT: Status: RESOLVED | Noted: 2018-03-31 | Resolved: 2025-01-30

## 2025-01-30 LAB
ANION GAP SERPL CALCULATED.3IONS-SCNC: 10 MMOL/L (ref 7–15)
BASOPHILS # BLD AUTO: 0.1 10E3/UL (ref 0–0.2)
BASOPHILS NFR BLD AUTO: 1 %
BUN SERPL-MCNC: 14.3 MG/DL (ref 6–20)
CALCIUM SERPL-MCNC: 9.4 MG/DL (ref 8.8–10.4)
CHLORIDE SERPL-SCNC: 101 MMOL/L (ref 98–107)
CREAT SERPL-MCNC: 0.75 MG/DL (ref 0.51–0.95)
EGFRCR SERPLBLD CKD-EPI 2021: >90 ML/MIN/1.73M2
EOSINOPHIL # BLD AUTO: 0.1 10E3/UL (ref 0–0.7)
EOSINOPHIL NFR BLD AUTO: 2 %
ERYTHROCYTE [DISTWIDTH] IN BLOOD BY AUTOMATED COUNT: 11.3 % (ref 10–15)
GLUCOSE SERPL-MCNC: 97 MG/DL (ref 70–99)
HCO3 SERPL-SCNC: 26 MMOL/L (ref 22–29)
HCT VFR BLD AUTO: 39 % (ref 35–47)
HGB BLD-MCNC: 13.1 G/DL (ref 11.7–15.7)
IMM GRANULOCYTES # BLD: 0 10E3/UL
IMM GRANULOCYTES NFR BLD: 0 %
LYMPHOCYTES # BLD AUTO: 2.3 10E3/UL (ref 0.8–5.3)
LYMPHOCYTES NFR BLD AUTO: 33 %
MCH RBC QN AUTO: 30.4 PG (ref 26.5–33)
MCHC RBC AUTO-ENTMCNC: 33.6 G/DL (ref 31.5–36.5)
MCV RBC AUTO: 91 FL (ref 78–100)
MONOCYTES # BLD AUTO: 0.3 10E3/UL (ref 0–1.3)
MONOCYTES NFR BLD AUTO: 4 %
NEUTROPHILS # BLD AUTO: 4.3 10E3/UL (ref 1.6–8.3)
NEUTROPHILS NFR BLD AUTO: 60 %
NRBC # BLD AUTO: 0 10E3/UL
NRBC BLD AUTO-RTO: 0 /100
PLATELET # BLD AUTO: 248 10E3/UL (ref 150–450)
POTASSIUM SERPL-SCNC: 3.9 MMOL/L (ref 3.4–5.3)
RBC # BLD AUTO: 4.31 10E6/UL (ref 3.8–5.2)
SODIUM SERPL-SCNC: 137 MMOL/L (ref 135–145)
WBC # BLD AUTO: 7.1 10E3/UL (ref 4–11)

## 2025-01-30 PROCEDURE — 85004 AUTOMATED DIFF WBC COUNT: CPT | Performed by: EMERGENCY MEDICINE

## 2025-01-30 PROCEDURE — 96375 TX/PRO/DX INJ NEW DRUG ADDON: CPT

## 2025-01-30 PROCEDURE — 70450 CT HEAD/BRAIN W/O DYE: CPT

## 2025-01-30 PROCEDURE — 80048 BASIC METABOLIC PNL TOTAL CA: CPT | Performed by: EMERGENCY MEDICINE

## 2025-01-30 PROCEDURE — 85014 HEMATOCRIT: CPT | Performed by: EMERGENCY MEDICINE

## 2025-01-30 PROCEDURE — 85041 AUTOMATED RBC COUNT: CPT | Performed by: EMERGENCY MEDICINE

## 2025-01-30 PROCEDURE — 82310 ASSAY OF CALCIUM: CPT | Performed by: EMERGENCY MEDICINE

## 2025-01-30 PROCEDURE — 96374 THER/PROPH/DIAG INJ IV PUSH: CPT

## 2025-01-30 PROCEDURE — 99285 EMERGENCY DEPT VISIT HI MDM: CPT | Mod: 25

## 2025-01-30 PROCEDURE — 85025 COMPLETE CBC W/AUTO DIFF WBC: CPT | Performed by: STUDENT IN AN ORGANIZED HEALTH CARE EDUCATION/TRAINING PROGRAM

## 2025-01-30 PROCEDURE — 80048 BASIC METABOLIC PNL TOTAL CA: CPT | Performed by: STUDENT IN AN ORGANIZED HEALTH CARE EDUCATION/TRAINING PROGRAM

## 2025-01-30 PROCEDURE — 250N000011 HC RX IP 250 OP 636: Performed by: STUDENT IN AN ORGANIZED HEALTH CARE EDUCATION/TRAINING PROGRAM

## 2025-01-30 PROCEDURE — 36415 COLL VENOUS BLD VENIPUNCTURE: CPT | Performed by: EMERGENCY MEDICINE

## 2025-01-30 RX ORDER — DROPERIDOL 2.5 MG/ML
1.25 INJECTION, SOLUTION INTRAMUSCULAR; INTRAVENOUS ONCE
Status: COMPLETED | OUTPATIENT
Start: 2025-01-30 | End: 2025-01-30

## 2025-01-30 RX ORDER — DEXAMETHASONE SODIUM PHOSPHATE 10 MG/ML
10 INJECTION, SOLUTION INTRAMUSCULAR; INTRAVENOUS ONCE
Status: COMPLETED | OUTPATIENT
Start: 2025-01-30 | End: 2025-01-30

## 2025-01-30 RX ORDER — KETOROLAC TROMETHAMINE 15 MG/ML
15 INJECTION, SOLUTION INTRAMUSCULAR; INTRAVENOUS ONCE
Status: COMPLETED | OUTPATIENT
Start: 2025-01-30 | End: 2025-01-30

## 2025-01-30 RX ADMIN — DEXAMETHASONE SODIUM PHOSPHATE 10 MG: 10 INJECTION, SOLUTION INTRAMUSCULAR; INTRAVENOUS at 18:52

## 2025-01-30 RX ADMIN — DROPERIDOL 1.25 MG: 2.5 INJECTION, SOLUTION INTRAMUSCULAR; INTRAVENOUS at 18:54

## 2025-01-30 RX ADMIN — KETOROLAC TROMETHAMINE 15 MG: 15 INJECTION, SOLUTION INTRAMUSCULAR; INTRAVENOUS at 18:50

## 2025-01-30 ASSESSMENT — COLUMBIA-SUICIDE SEVERITY RATING SCALE - C-SSRS
1. IN THE PAST MONTH, HAVE YOU WISHED YOU WERE DEAD OR WISHED YOU COULD GO TO SLEEP AND NOT WAKE UP?: NO
2. HAVE YOU ACTUALLY HAD ANY THOUGHTS OF KILLING YOURSELF IN THE PAST MONTH?: NO
6. HAVE YOU EVER DONE ANYTHING, STARTED TO DO ANYTHING, OR PREPARED TO DO ANYTHING TO END YOUR LIFE?: NO

## 2025-01-30 ASSESSMENT — ACTIVITIES OF DAILY LIVING (ADL)
ADLS_ACUITY_SCORE: 41
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
## 2025-01-31 NOTE — DISCHARGE INSTRUCTIONS
Thank you for allowing us to evaluate you today.  Follow up with  concussion clinic  in 3-5 days for reevaluation..  Drink plenty of fluids.  Get rest when you need to. Get good sleep and take Tylenol and ibuprofen for headache   Please read the guidance provided with your discharge instructions.  Immediately return to the emergency department with any concerns.    
Patient's belongings returned
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
## 2025-07-19 ENCOUNTER — HEALTH MAINTENANCE LETTER (OUTPATIENT)
Age: 40
End: 2025-07-19

## (undated) RX ORDER — METOPROLOL TARTRATE 50 MG
TABLET ORAL
Status: DISPENSED
Start: 2022-03-23

## (undated) RX ORDER — REGADENOSON 0.08 MG/ML
INJECTION, SOLUTION INTRAVENOUS
Status: DISPENSED
Start: 2022-03-15

## (undated) RX ORDER — NITROGLYCERIN 0.4 MG/1
TABLET SUBLINGUAL
Status: DISPENSED
Start: 2022-03-23

## (undated) RX ORDER — METOPROLOL TARTRATE 1 MG/ML
INJECTION, SOLUTION INTRAVENOUS
Status: DISPENSED
Start: 2022-03-23

## (undated) RX ORDER — IVABRADINE 5 MG/1
TABLET, FILM COATED ORAL
Status: DISPENSED
Start: 2022-03-23